# Patient Record
Sex: MALE | Race: WHITE | NOT HISPANIC OR LATINO | Employment: FULL TIME | URBAN - METROPOLITAN AREA
[De-identification: names, ages, dates, MRNs, and addresses within clinical notes are randomized per-mention and may not be internally consistent; named-entity substitution may affect disease eponyms.]

---

## 2017-03-17 ENCOUNTER — TELEPHONE (OUTPATIENT)
Dept: FAMILY MEDICINE | Facility: CLINIC | Age: 37
End: 2017-03-17

## 2017-03-17 RX ORDER — PENICILLIN V POTASSIUM 500 MG/1
500 TABLET, FILM COATED ORAL 3 TIMES DAILY
Qty: 30 TABLET | Refills: 0 | COMMUNITY
Start: 2017-03-17 | End: 2018-05-17

## 2017-03-17 NOTE — TELEPHONE ENCOUNTER
Per RM patient need RX for  mg. See entered historical med order. This was called to [facility] for patient  Gillian Stratton CMA

## 2018-05-15 DIAGNOSIS — Z91.030 BEE STING ALLERGY: ICD-10-CM

## 2018-05-15 DIAGNOSIS — N52.9 ERECTILE DYSFUNCTION, UNSPECIFIED ERECTILE DYSFUNCTION TYPE: ICD-10-CM

## 2018-05-15 NOTE — TELEPHONE ENCOUNTER
"Last Written Prescription Date:  9/19/16  Last Fill Quantity: 10,  # refills: 5   Last office visit: 12/15/2016 with prescribing provider:  12/15/16   Future Office Visit:        Requested Prescriptions   Pending Prescriptions Disp Refills     VIAGRA 100 MG tablet [Pharmacy Med Name: SILDENAFIL 100MG TAB] 6 tablet 9     Sig: TAKE 0.5-1 TABLET BY MOUTH 30 MINUTES BEFORE INTERCOURSE. NO MORE THEN 1 FULL TABLET PER DAY    Erectile Dysfuction Protocol Failed    5/15/2018 11:54 AM       Failed - Recent (12 mo) or future (30 days) visit within the authorizing provider's specialty    Patient had office visit in the last 12 months or has a visit in the next 30 days with authorizing provider or within the authorizing provider's specialty.  See \"Patient Info\" tab in inbasket, or \"Choose Columns\" in Meds & Orders section of the refill encounter.           Passed - Absence of nitrates on medication list       Passed - Absence of Alpha Blockers on Med list       Passed - Patient is age 18 or older          "

## 2018-05-15 NOTE — TELEPHONE ENCOUNTER
"Requested Prescriptions   Pending Prescriptions Disp Refills     EPINEPHrine (EPIPEN/ADRENACLICK/OR ANY BX GENERIC EQUIV) 0.3 MG/0.3ML injection 2-pack [Pharmacy Med Name: EPINEPHRINE 0.3/0.3ML INJ]  0     Sig: INJECT 0.3 MLS INTO THE MUSCLE AS NEEDED FOR ANAPHYLAXIS    Anaphylaxis Kits Protocol Failed    5/15/2018 11:54 AM       Failed - Recent (12 mo) or future (30 days) visit witin the authorizing provider's specialty    Patient had office visit in the last 12 months or has a visit in the next 30 days with authorizing provider or within the authorizing provider's specialty.  See \"Patient Info\" tab in inbasket, or \"Choose Columns\" in Meds & Orders section of the refill encounter.           Passed - Patient is age 5 or older          Last Written Prescription Date:  8/31/16  Last Fill Quantity: 0.6 mL,  # refills: 3   Last Office Visit with [facility] or [facility] prescribing provider:  12/15/16   Future Office Visit:       "

## 2018-05-16 RX ORDER — EPINEPHRINE 0.3 MG/.3ML
INJECTION SUBCUTANEOUS
Qty: 2 ML | Refills: 0 | Status: SHIPPED | OUTPATIENT
Start: 2018-05-16 | End: 2018-05-22

## 2018-05-16 NOTE — TELEPHONE ENCOUNTER
Medication is being filled for 1 time refill only due to:  Patient needs to be seen because it has been more than one year since last visit.  LOV 12/15/16. Notified patient per comment section of RX.   Pt has BEE Allergy..........................SHAGGY Lange

## 2018-05-16 NOTE — TELEPHONE ENCOUNTER
Routing VIAGRA  refill request to provider for review/approval because:  Patient needs to be seen because it has been more than 1 year since last office visit.( LOV: 12/15/16) According to the refill protocol , pt needs to be seen in clinic annually for med use. Notified patient per comment section of RX.  SHAGGY Lange

## 2018-05-17 ENCOUNTER — DOCUMENTATION ONLY (OUTPATIENT)
Dept: SLEEP MEDICINE | Facility: CLINIC | Age: 38
End: 2018-05-17

## 2018-05-17 ENCOUNTER — TELEPHONE (OUTPATIENT)
Dept: FAMILY MEDICINE | Facility: CLINIC | Age: 38
End: 2018-05-17

## 2018-05-17 ENCOUNTER — OFFICE VISIT (OUTPATIENT)
Dept: SLEEP MEDICINE | Facility: CLINIC | Age: 38
End: 2018-05-17
Payer: COMMERCIAL

## 2018-05-17 VITALS
DIASTOLIC BLOOD PRESSURE: 64 MMHG | HEIGHT: 72 IN | RESPIRATION RATE: 20 BRPM | WEIGHT: 229 LBS | HEART RATE: 76 BPM | OXYGEN SATURATION: 95 % | BODY MASS INDEX: 31.02 KG/M2 | SYSTOLIC BLOOD PRESSURE: 127 MMHG

## 2018-05-17 DIAGNOSIS — T63.441A ALLERGIC REACTION TO BEE STING: ICD-10-CM

## 2018-05-17 DIAGNOSIS — R06.81 APNEA: ICD-10-CM

## 2018-05-17 DIAGNOSIS — R06.83 SNORING: Primary | ICD-10-CM

## 2018-05-17 DIAGNOSIS — R53.83 OTHER FATIGUE: Primary | ICD-10-CM

## 2018-05-17 PROCEDURE — 99203 OFFICE O/P NEW LOW 30 MIN: CPT | Performed by: OTOLARYNGOLOGY

## 2018-05-17 RX ORDER — SILDENAFIL CITRATE 100 MG
TABLET ORAL
Qty: 6 TABLET | Refills: 0 | Status: SHIPPED | OUTPATIENT
Start: 2018-05-17 | End: 2020-05-05

## 2018-05-17 NOTE — MR AVS SNAPSHOT
After Visit Summary   5/17/2018    Jon Gilmore    MRN: [id number]           Patient Information     Date Of Birth          1980        Visit Information        Provider Department      5/17/2018 4:45 PM Chas Aldridge MD [facility]        Today's Diagnoses     Snoring    -  1    Apnea           Follow-ups after your visit        Your next 10 appointments already scheduled     May 22, 2018  1:00 PM CDT   New Visit with Kamaljit Vazquez DO   [facility]    [address]  [place] [address]   [phone]              Future tests that were ordered for you today     Open Future Orders        Priority Expected Expires Ordered    Comprehensive Sleep Study Routine  11/13/2018 5/17/2018    SLEEP EVALUATION & MANAGEMENT REFERRAL - ADULT -[facility] [phone] (Age 13 if over 100 lbs) Routine  5/17/2019 5/17/2018            Who to contact     If you have questions or need follow up information about today's clinic visit or your schedule please contact [facility] directly at [phone].  Normal or non-critical lab and imaging results will be communicated to you by kissnofroghart, letter or phone within 4 business days after the clinic has received the results. If you do not hear from us within 7 days, please contact the clinic through kissnofroghart or phone. If you have a critical or abnormal lab result, we will notify you by phone as soon as possible.  Submit refill requests through Rekoo or call your pharmacy and they will forward the refill request to us. Please allow 3 business days for your refill to be completed.          Additional Information About Your Visit        MyChart Information     Rekoo gives you secure access to your electronic health record. If you see a primary care provider, you can also send messages to your care team and make appointments. If you  "have questions, please call your primary care clinic.  If you do not have a primary care provider, please call [phone] and they will assist you.        Care EveryWhere ID     This is your Care EveryWhere ID. This could be used by other organizations to access your [place] medical records  [id number]        Your Vitals Were     Pulse Respirations Height Pulse Oximetry BMI (Body Mass Index)       76 20 1.816 m (5' 11.5\") 95% 31.49 kg/m2        Blood Pressure from Last 3 Encounters:   05/17/18 127/64   12/15/16 114/78   11/21/16 108/78    Weight from Last 3 Encounters:   05/17/18 103.9 kg (229 lb)   12/15/16 103.4 kg (228 lb)   11/21/16 104.3 kg (230 lb)               Primary Care Provider Office Phone # Fax #    Cedalexey Herrera [phone] [address] [place] [address]        Equal Access to Services     BEN HANNA : Hadii aad ku hadasho Soomaali, waaxda luqadaha, qaybta kaalmada adeegyada, waxay idiin hayaan little ang . So [facility] [phone].    ATENCIÓN: Si habla español, tiene a candelaria disposición servicios gratuitos de asistencia lingüística. [facility] [phone].    We comply with applicable federal civil rights laws and [place] laws. We do not discriminate on the basis of race, color, national origin, age, disability, sex, sexual orientation, or gender identity.            Thank you!     Thank you for choosing [facility]  for your care. Our goal is always to provide you with excellent care. Hearing back from our patients is one way we can continue to improve our services. Please take a few minutes to complete the written survey that you may receive in the mail after your visit with us. Thank you!             Your Updated Medication List - Protect others around you: Learn how to safely use, store and throw away your medicines at www.disposemymeds.org.          This list is accurate as of 5/17/18  5:57 PM.  Always use your most recent " med list.                   Brand Name Dispense Instructions for use Diagnosis    EPINEPHrine 0.3 MG/0.3ML injection 2-pack    EPIPEN/ADRENACLICK/or ANY BX GENERIC EQUIV    2 mL    INJECT 0.3 MLS INTO THE MUSCLE AS NEEDED FOR ANAPHYLAXIS    Bee sting allergy       VIAGRA 100 MG tablet   Generic drug:  sildenafil     6 tablet    TAKE 0.5-1 TABLET BY MOUTH 30 MINUTES BEFORE INTERCOURSE. NO MORE THEN 1 FULL TABLET PER DAY    Erectile dysfunction, unspecified erectile dysfunction type

## 2018-05-17 NOTE — TELEPHONE ENCOUNTER
Per provider referral was placed.  The sleep center will call patient. And patient will call to schedule with Allergy. Patient informed.  Vito Hansen MA

## 2018-05-17 NOTE — NURSING NOTE
Patient called wanting to set up a consult with a sleep provider. Appointment is scheduled.  Patient also asked what his [employer] would cover. Called [employer] and customer care service told me that the office visit is covered and no prior authorizations needed for HST or in lab test to be completed. If CPAP treatment is needed it is also covered and no prior authorizations needed.     Pt's Group number on card is scratched off by being in wallet. His member ID is [id number].    Called and let pt know.    Jessi Yeboah, CMA

## 2018-05-17 NOTE — PROGRESS NOTES
Sleep Consultation:    Date on this visit: 5/17/2018    Jon Gilmore is sent by No ref. provider found for a sleep consultation regarding snoring.    Primary Physician: Ced Herrera     Jon Gilmore reports nightly snoring and gasping for few years.     Jon goes to sleep at 11:00 PM during the week but varies and inconsistent. He wakes up at 6:00 AM with an alarm. He falls asleep in 5 minutes.  Jon denies difficulty falling asleep.  He wakes up 1-2 times a night for 20 minutes before falling back to sleep.  Jon wakes up to go to the bathroom.  On weekends, Jon goes to sleep at 11:00 PM.  He wakes up at 8:00 AM without an alarm. He falls asleep in 5 minutes.  Patient gets an average of 6 to 7 hours of sleep per night.     Patient does NOT use electronics in bed, watch TV in bed and read in bed.     Jon does not do shift work.  He works day shifts.      Jon does snore every night. Patient does have a regular bed partner. There is report of snoring and gasping.  He does have witnessed apneas. They occasionally sleep separately.  Patient sleeps on his back, side and  Occasionally stomach. He denies occasional sleep disturbance, snort arousals, morning dry mouth, morning headaches and morning confusion. Jon denies any sleep walking, sleep talking, dream enactment, sleep paralysis, cataplexy and hypnogogic/hypnopompic hallucinations. Occasionally has bruxism at night. Even though no RLS but wears sheets at night with constant leg movement and tossing and turning.    He denies sleep walking, sleep talking, enuresis and sleep terrors as a child.  Jon has some difficulty breathing through his nose.      Jon has gained 0-5 pounds in the last year.  Patient describes themself as a night person. Patient's Dexter Sleepiness score 8/24 inconsistent with excessive  daytime sleepiness.      Jon naps 1-2 times per week for 10-20 minutes, feels refreshed after naps. He takes  no inadvertant naps.  He denies closing eyes, dozing and falling asleep while driving.   Patient was counseled on the importance of driving while alert, to pull over if drowsy, or nap before getting into the vehicle if sleepy.  He uses 1 to 2 sodas/day. Last caffeine intake is usually before noon.    Allergies:    Allergies   Allergen Reactions     Bee Anaphylaxis     Cats Unknown     Itchey eyes, an runny nose       Medications:    Current Outpatient Prescriptions   Medication Sig Dispense Refill     VIAGRA 100 MG tablet TAKE 0.5-1 TABLET BY MOUTH 30 MINUTES BEFORE INTERCOURSE. NO MORE THEN 1 FULL TABLET PER DAY 6 tablet 0     EPINEPHrine (EPIPEN/ADRENACLICK/OR ANY BX GENERIC EQUIV) 0.3 MG/0.3ML injection 2-pack INJECT 0.3 MLS INTO THE MUSCLE AS NEEDED FOR ANAPHYLAXIS (Patient not taking: Reported on 5/17/2018) 2 mL 0       Problem List:  Patient Active Problem List    Diagnosis Date Noted     Old complete tear of anterior cruciate ligament of right knee 08/11/2016     Priority: Medium     Chondromalacia of left knee 08/11/2016     Priority: Medium     CARDIOVASCULAR SCREENING; LDL GOAL LESS THAN 160 10/31/2010     Priority: Medium        Past Medical/Surgical History:  History reviewed. No pertinent past medical history.  Past Surgical History:   Procedure Laterality Date     C INCISION OF PYLORIC MUSCLE      infant:  pyloric stenosis       Social History:  Social History     Social History     Marital status:      Spouse name: Steven     Number of children: 1     Years of education: N/A     Occupational History      [employer]     Social History Main Topics     Smoking status: Never Smoker     Smokeless tobacco: Never Used     Alcohol use 0.0 oz/week     0 Standard drinks or equivalent per week      Comment: occ     Drug use: No     Sexual activity: Yes     Partners: Female     Other Topics Concern     Parent/Sibling W/ Cabg, Mi Or Angioplasty Before 65f 55m? No     Social History Narrative  "      Family History:  Family History   Problem Relation Age of Onset     Depression Father        Review of Systems:  A complete review of systems reviewed by me is negative with the exeption of what has been mentioned in the history of present illness.  CONSTITUTIONAL: NEGATIVE for weight gain/loss, fever, chills, sweats or night sweats, drug allergies.  EYES: NEGATIVE for changes in vision, blind spots, double vision.  ENT: NEGATIVE for ear pain, sore throat, sinus pain, post-nasal drip, runny nose, bloody nose  CARDIAC: NEGATIVE for fast heartbeats or fluttering in chest, chest pain or pressure, breathlessness when lying flat, swollen legs or swollen feet.  NEUROLOGIC: NEGATIVE headaches, weakness or numbness in the arms or legs.  DERMATOLOGIC: NEGATIVE for rashes, new moles or change in mole(s)  PULMONARY: NEGATIVE SOB at rest, SOB with activity, dry cough, productive cough, coughing up blood, wheezing or whistling when breathing.    GASTROINTESTINAL: NEGATIVE for nausea or vomitting, loose or watery stools, fat or grease in stools, constipation, abdominal pain, bowel movements black in color or blood noted.  GENITOURINARY: NEGATIVE for pain during urination, blood in urine, urinating more frequently than usual, irregular menstrual periods.  MUSCULOSKELETAL: NEGATIVE for muscle pain, bone or joint pain, swollen joints.  ENDOCRINE: NEGATIVE for increased thirst or urination, diabetes.  LYMPHATIC: NEGATIVE for swollen lymph nodes, lumps or bumps in the breasts or nipple discharge.    Physical Examination:  Vitals: /64  Pulse 76  Resp 20  Ht 1.816 m (5' 11.5\")  Wt 103.9 kg (229 lb)  SpO2 95%  BMI 31.49 kg/m2  BMI= Body mass index is 31.49 kg/(m^2).         [place] Total Score 5/17/2018   Total score - [place] 3       GENERAL APPEARANCE: healthy, alert, no distress and over weight  EYES: Eyes grossly normal to inspection, PERRL and conjunctivae and sclerae normal  HENT: ear canals and TM's normal, nose " and mouth without ulcers or lesions, oropharynx crowded and uvula elongated  NECK: no adenopathy, no asymmetry, masses, or scars and thyroid normal to palpation  NEURO: Normal strength and tone, mentation intact and speech normal  PSYCH: mentation appears normal and affect normal/bright  Mallampati Class: III.  Tonsillar Stage: 1  hidden by pillars.  Nose - deviated septum to the left  Impression/Plan:    The patient with symptoms of snoring, and witnessed apneas.  Literature provided regarding sleep apnea and restless leg syndrome.      He will follow up with me in approximately two weeks after his sleep study has been competed to review the results and discuss plan of care.       Polysomnography reviewed.  Obstructive sleep apnea reviewed.  Complications of untreated sleep apnea were reviewed.    Chas Aldridge MD    CC: No ref. provider found

## 2018-05-21 ENCOUNTER — TELEPHONE (OUTPATIENT)
Dept: FAMILY MEDICINE | Facility: OTHER | Age: 38
End: 2018-05-21

## 2018-05-21 NOTE — TELEPHONE ENCOUNTER
Returned the patient's call, no answer, left a message to call back to discuss. Left the patient my direct extension for the day [phone]. Informed the patient that I will be going to lunch, but I can be reached at my extension any time after 2:00pm today. Will wait for a call back.    Dana Phillips CMA on 5/21/2018 at 1:14 PM

## 2018-05-21 NOTE — TELEPHONE ENCOUNTER
Reason for Call:  Other call back    Detailed comments: Pt is seeing Dr. Vazquez tomorrow and is wondering if he is going to get the shot this day as well, or what is all going on for this appt, as he needs to call insurance. Please advise.     Phone Number Patient can be reached at: Home number on file [phone] (home)    Best Time: any     Can we leave a detailed message on this number? YES    Call taken on 5/21/2018 at 12:54 PM by Gillian Barroso

## 2018-05-22 ENCOUNTER — OFFICE VISIT (OUTPATIENT)
Dept: ALLERGY | Facility: OTHER | Age: 38
End: 2018-05-22
Payer: COMMERCIAL

## 2018-05-22 VITALS
TEMPERATURE: 97.8 F | HEART RATE: 72 BPM | DIASTOLIC BLOOD PRESSURE: 70 MMHG | BODY MASS INDEX: 31.29 KG/M2 | WEIGHT: 227.5 LBS | SYSTOLIC BLOOD PRESSURE: 106 MMHG | OXYGEN SATURATION: 96 %

## 2018-05-22 DIAGNOSIS — J30.81 ALLERGIC RHINITIS DUE TO ANIMAL DANDER: ICD-10-CM

## 2018-05-22 DIAGNOSIS — J30.89 ALLERGIC RHINITIS DUE TO MOLD: ICD-10-CM

## 2018-05-22 DIAGNOSIS — T78.2XXD ANAPHYLAXIS DUE TO HYMENOPTERA VENOM, ACCIDENTAL OR UNINTENTIONAL, SUBSEQUENT ENCOUNTER: ICD-10-CM

## 2018-05-22 DIAGNOSIS — T63.481D ANAPHYLAXIS DUE TO HYMENOPTERA VENOM, ACCIDENTAL OR UNINTENTIONAL, SUBSEQUENT ENCOUNTER: ICD-10-CM

## 2018-05-22 DIAGNOSIS — J30.1 CHRONIC SEASONAL ALLERGIC RHINITIS DUE TO POLLEN: Primary | ICD-10-CM

## 2018-05-22 DIAGNOSIS — J30.89 ALLERGIC RHINITIS DUE TO DUST MITE: ICD-10-CM

## 2018-05-22 PROBLEM — H10.9 RHINOCONJUNCTIVITIS: Status: ACTIVE | Noted: 2018-05-22

## 2018-05-22 PROBLEM — J31.0 RHINOCONJUNCTIVITIS: Status: ACTIVE | Noted: 2018-05-22

## 2018-05-22 PROCEDURE — 86003 ALLG SPEC IGE CRUDE XTRC EA: CPT | Mod: 90 | Performed by: ALLERGY & IMMUNOLOGY

## 2018-05-22 PROCEDURE — 99204 OFFICE O/P NEW MOD 45 MIN: CPT | Mod: 25 | Performed by: ALLERGY & IMMUNOLOGY

## 2018-05-22 PROCEDURE — 86003 ALLG SPEC IGE CRUDE XTRC EA: CPT | Performed by: ALLERGY & IMMUNOLOGY

## 2018-05-22 PROCEDURE — 36415 COLL VENOUS BLD VENIPUNCTURE: CPT | Performed by: ALLERGY & IMMUNOLOGY

## 2018-05-22 PROCEDURE — 99000 SPECIMEN HANDLING OFFICE-LAB: CPT | Performed by: ALLERGY & IMMUNOLOGY

## 2018-05-22 PROCEDURE — 95004 PERQ TESTS W/ALRGNC XTRCS: CPT | Performed by: ALLERGY & IMMUNOLOGY

## 2018-05-22 RX ORDER — EPINEPHRINE 0.3 MG/.3ML
0.3 INJECTION SUBCUTANEOUS PRN
Qty: 2 ML | Refills: 1 | Status: SHIPPED | OUTPATIENT
Start: 2018-05-22 | End: 2018-06-01

## 2018-05-22 NOTE — NURSING NOTE
Per provider verbal order, placed Adult Environmental Panel scratch test.  Consent was obtained prior to procedure.  Once panels were placed, patient was monitored for 15 minutes in clinic.  RN read test after 15 minutes and provider was notified of results.  Pt tolerated procedure well.  All questions and concerns were addressed at office visit.     Thelma Cruz RN

## 2018-05-22 NOTE — TELEPHONE ENCOUNTER
Patient returned call, he wanted to know more about allergy testing. Went over the type of skin prick testing that we do and the process. Patient will keep his appointment and discuss further with doctor.    Dana Phillips CMA on 5/22/2018 at 10:03 AM

## 2018-05-22 NOTE — PROGRESS NOTES
Jon Gilmore is a 37 year old White male with previous medical history significant for venom allergy. Jon Gilmore is being seen today for evaluation of insect bite sensitivity and seasonal allergies.  The patient has been seen in consultation at the request of Dr. Frances WILLAMS.     The patient reports that for the majority of his life in the spring he has had sneezing, congestion, ocular itching and rhinorrhea.  Allegra has been used and unclear if beneficial.  He is using over-the-counter eyedrop and this has been helpful.  No symptoms around dogs, dust, mowing grass, raking leaves, smoke or perfumes.  Increased symptoms around cats.  No history of allergy testing.  No use of nasal corticosteroid.    Patient was stung by a flying stinging insect approximately 12 years ago.  He was stung on his great toe.  He immediately developed shortness of breath, diffuse pruritus, erythema of his skin, and a sensation that he was going to pass out.  His father has venom allergy and treated him with his EpiPen and this was significantly beneficial.  No subsequent stings.  He does not carry injectable epinephrine.  No history of venom skin testing.    The patient has no history of asthma, eczema, food allergies, medications allergies or hives.     ENVIRONMENTAL HISTORY: The family lives in a [facility] home [place] The home is heated with a forced air. They does have central air conditioning. The patient's bedroom is furnished with feather/wool bedding or pillows, carpeting in bedroom, allergen mattress cover and fabric window coverings.  Pets inside the house include 0 pets. There is not history of cockroach or mice infestation. There is/are 0 smokers in the house.  The house does not have a damp basement.     History reviewed. No pertinent past medical history.  Family History   Problem Relation Age of Onset     Depression Father      Past Surgical History:   Procedure Laterality Date     C INCISION OF  PYLORIC MUSCLE      infant:  pyloric stenosis       REVIEW OF SYSTEMS:  General: negative for weight gain. negative for weight loss. negative for changes in sleep.   Ears: negative for fullness. negative for hearing loss. negative for dizziness.   Nose: positive  for snoring.negative for changes in smell. negative for drainage.   Eyes: negative for eye watering. positive  for eye itching. negative for vision changes. negative for eye redness.  Throat: negative for hoarseness. negative for sore throat. negative for trouble swallowing.   Lungs: negative for shortness of breath.negative for wheezing. negative for sputum production.   Cardiovascular: negative for chest pain. negative for swelling of ankles. negative for fast or irregular heartbeat.   Gastrointestinal: negative for nausea. negative for heartburn. negative for acid reflux.   Musculoskeletal: negative for joint pain. negative for joint stiffness. negative for joint swelling.   Neurologic: negative for seizures. negative for fainting. negative for weakness.   Psychiatric: negative for changes in mood. positive  for anxiety.   Endocrine: negative for cold intolerance. negative for heat intolerance. negative for tremors.   Lymphatic: negative for lower extremity swelling. negative for lymph node swelling.   Hematologic: negative for easy bruising. negative for easy bleeding.  Integumentary: negative for rash. negative for scaling. negative for nail changes.       Current Outpatient Prescriptions:      EPINEPHrine (AUVI-Q) 0.3 MG/0.3ML injection 2-pack, Inject 0.3 mLs (0.3 mg) into the muscle as needed for anaphylaxis, Disp: 2 mL, Rfl: 1     ketotifen (ZADITOR/REFRESH ANTI-ITCH) 0.025 % SOLN ophthalmic solution, Place 1 drop into both eyes 2 times daily, Disp: 1 Bottle, Rfl: 3     VIAGRA 100 MG tablet, TAKE 0.5-1 TABLET BY MOUTH 30 MINUTES BEFORE INTERCOURSE. NO MORE THEN 1 FULL TABLET PER DAY, Disp: 6 tablet, Rfl: 0  Immunization History   Administered  Date(s) Administered     HEPA 02/11/2016     HepA-Adult 11/21/2016     HepB 10/07/2010, 11/04/2010, 02/03/2011     Influenza (IIV3) PF 11/05/2012     Influenza Vaccine IM 3yrs+ 4 Valent IIV4 12/18/2013, 02/11/2016, 11/21/2016     Influenza Vaccine, 3 YRS +, IM (QUADRIVALENT W/PRESERVATIVES) 11/06/2014     TD (ADULT, 7+) 01/01/2009     TDAP Vaccine (Adacel) 11/05/2012     Allergies   Allergen Reactions     Bee Anaphylaxis     Cats Unknown     Itchey eyes, an runny nose         EXAM:   Constitutional:  Appears well-developed and well-nourished. No distress.   HEENT:   Head: Normocephalic.   Right Ear: External ear normal. TM normal  Left Ear: External ear normal. TM normal  Mouth/Throat: No oropharyngeal exudate present.   No cobblestoning of posterior oropharynx.   Nasal tissue pale.    Eyes: Conjunctivae are erythematous   Ocular watering noted   No maxillary or frontal sinus tenderness to palpation.   Cardiovascular: Normal rate, regular rhythm and normal heart sounds. Exam reveals no gallop and no friction rub.   No murmur heard.  Respiratory: Effort normal and breath sounds normal. No respiratory distress. No wheezes. No rales.   Musculoskeletal: Normal range of motion.   Neuro: Oriented to person, place, and time.  Skin: Skin is warm and dry. No rash noted.   Psychiatric: Normal mood and affect.     Nursing note and vitals reviewed.      WORKUP:   Skin testing:  Positive for cat, dog, dust mite, trees, molds.    ASSESSMENT/PLAN:  Problem List Items Addressed This Visit        Respiratory    Chronic seasonal allergic rhinitis due to pollen - Primary     Spring nasal and ocular symptoms.     Skin testing:  Positive for cat, dog, dust mite, trees, molds.    - Ketotifen (Zaditor, Alaway) 1 drop/eye twice daily as needed.   - Allegra or Zyrtec daily as needed.   - Allergen avoidance measures discussed and literature provided.          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  No subsequent stings.  No history of venom testing.  No history of venom immunotherapy.  Next    -Serum IgE for yellow jacket, white faced hornet, yellow faced hornet, honeybee and wasp.  -If positive testing would recommend venom immunotherapy.  If negative testing he needs follow-up venom skin testing when this comes off back order.  -Continue to avoid insect stings.  -An anaphylaxis action plan was given and reviewed with patient and family.   -Injectable epinephrine use was reviewed and demonstrated. The patient will need to carry injectable epinephrine.   -Injectable epinephrine script provided.            Relevant Medications    EPINEPHrine (AUVI-Q) 0.3 MG/0.3ML injection 2-pack    Other Relevant Orders    Allergen honeybee IgE (Completed)    Allergen paper wasp IgE (Completed)    Allergen whiteface hornet IgE (Completed)    Allergen yellow hornet IgE (Completed)    IgE for yellow jacket: Laboratory Miscellaneous Order (Completed)        Return yearly.    Chart documentation with Dragon Voice recognition Software. Although reviewed after completion, some words and grammatical errors may remain.    Kamaljit Vazquez DO   Allergy/Immunology  [facility], [place] and [place]

## 2018-05-22 NOTE — LETTER
5/22/2018         RE: Jon Gilmore  [id number] [facility] [id number]        Dear Colleague,    Thank you for referring your patient, Jon Gilmore, to the [facility]. Please see a copy of my visit note below.    Jon Gilmore is a 37 year old White male with previous medical history significant for venom allergy. Jon Gilmore is being seen today for evaluation of insect bite sensitivity and seasonal allergies.  The patient has been seen in consultation at the request of Dr. Frances WILLAMS.     The patient reports that for the majority of his life in the spring he has had sneezing, congestion, ocular itching and rhinorrhea.  Allegra has been used and unclear if beneficial.  He is using over-the-counter eyedrop and this has been helpful.  No symptoms around dogs, dust, mowing grass, raking leaves, smoke or perfumes.  Increased symptoms around cats.  No history of allergy testing.  No use of nasal corticosteroid.    Patient was stung by a flying stinging insect approximately 12 years ago.  He was stung on his great toe.  He immediately developed shortness of breath, diffuse pruritus, erythema of his skin, and a sensation that he was going to pass out.  His father has venom allergy and treated him with his EpiPen and this was significantly beneficial.  No subsequent stings.  He does not carry injectable epinephrine.  No history of venom skin testing.    The patient has no history of asthma, eczema, food allergies, medications allergies or hives.     ENVIRONMENTAL HISTORY: The family lives in a [facility] home [place]. The home is heated with a forced air. They does have central air conditioning. The patient's bedroom is furnished with feather/wool bedding or pillows, carpeting in bedroom, allergen mattress cover and fabric window coverings.  Pets inside the house include 0 pets. There is not history of cockroach or mice infestation. There is/are 0 smokers in the  house.  The house does not have a damp basement.     History reviewed. No pertinent past medical history.  Family History   Problem Relation Age of Onset     Depression Father      Past Surgical History:   Procedure Laterality Date     C INCISION OF PYLORIC MUSCLE      infant:  pyloric stenosis       REVIEW OF SYSTEMS:  General: negative for weight gain. negative for weight loss. negative for changes in sleep.   Ears: negative for fullness. negative for hearing loss. negative for dizziness.   Nose: positive  for snoring.negative for changes in smell. negative for drainage.   Eyes: negative for eye watering. positive  for eye itching. negative for vision changes. negative for eye redness.  Throat: negative for hoarseness. negative for sore throat. negative for trouble swallowing.   Lungs: negative for shortness of breath.negative for wheezing. negative for sputum production.   Cardiovascular: negative for chest pain. negative for swelling of ankles. negative for fast or irregular heartbeat.   Gastrointestinal: negative for nausea. negative for heartburn. negative for acid reflux.   Musculoskeletal: negative for joint pain. negative for joint stiffness. negative for joint swelling.   Neurologic: negative for seizures. negative for fainting. negative for weakness.   Psychiatric: negative for changes in mood. positive  for anxiety.   Endocrine: negative for cold intolerance. negative for heat intolerance. negative for tremors.   Lymphatic: negative for lower extremity swelling. negative for lymph node swelling.   Hematologic: negative for easy bruising. negative for easy bleeding.  Integumentary: negative for rash. negative for scaling. negative for nail changes.       Current Outpatient Prescriptions:      EPINEPHrine (AUVI-Q) 0.3 MG/0.3ML injection 2-pack, Inject 0.3 mLs (0.3 mg) into the muscle as needed for anaphylaxis, Disp: 2 mL, Rfl: 1     ketotifen (ZADITOR/REFRESH ANTI-ITCH) 0.025 % SOLN ophthalmic solution,  Place 1 drop into both eyes 2 times daily, Disp: 1 Bottle, Rfl: 3     VIAGRA 100 MG tablet, TAKE 0.5-1 TABLET BY MOUTH 30 MINUTES BEFORE INTERCOURSE. NO MORE THEN 1 FULL TABLET PER DAY, Disp: 6 tablet, Rfl: 0  Immunization History   Administered Date(s) Administered     HEPA 02/11/2016     HepA-Adult 11/21/2016     HepB 10/07/2010, 11/04/2010, 02/03/2011     Influenza (IIV3) PF 11/05/2012     Influenza Vaccine IM 3yrs+ 4 Valent IIV4 12/18/2013, 02/11/2016, 11/21/2016     Influenza Vaccine, 3 YRS +, IM (QUADRIVALENT W/PRESERVATIVES) 11/06/2014     TD (ADULT, 7+) 01/01/2009     TDAP Vaccine (Adacel) 11/05/2012     Allergies   Allergen Reactions     Bee Anaphylaxis     Cats Unknown     Itchey eyes, an runny nose         EXAM:   Constitutional:  Appears well-developed and well-nourished. No distress.   HEENT:   Head: Normocephalic.   Right Ear: External ear normal. TM normal  Left Ear: External ear normal. TM normal  Mouth/Throat: No oropharyngeal exudate present.   No cobblestoning of posterior oropharynx.   Nasal tissue pale.    Eyes: Conjunctivae are erythematous   Ocular watering noted   No maxillary or frontal sinus tenderness to palpation.   Cardiovascular: Normal rate, regular rhythm and normal heart sounds. Exam reveals no gallop and no friction rub.   No murmur heard.  Respiratory: Effort normal and breath sounds normal. No respiratory distress. No wheezes. No rales.   Musculoskeletal: Normal range of motion.   Neuro: Oriented to person, place, and time.  Skin: Skin is warm and dry. No rash noted.   Psychiatric: Normal mood and affect.     Nursing note and vitals reviewed.      WORKUP:   Skin testing:  Positive for cat, dog, dust mite, trees, molds.    ASSESSMENT/PLAN:  Problem List Items Addressed This Visit        Respiratory    Chronic seasonal allergic rhinitis due to pollen - Primary     Spring nasal and ocular symptoms.     Skin testing:  Positive for cat, dog, dust mite, trees, molds.    - Ketotifen  (Zaditor, Alaway) 1 drop/eye twice daily as needed.   - Allegra or Zyrtec daily as needed.   - Allergen avoidance measures discussed and literature provided.          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  No subsequent stings.  No history of venom testing.  No history of venom immunotherapy.  Next    -Serum IgE for yellow jacket, white faced hornet, yellow faced hornet, honeybee and wasp.  -If positive testing would recommend venom immunotherapy.  If negative testing he needs follow-up venom skin testing when this comes off back order.  -Continue to avoid insect stings.  -An anaphylaxis action plan was given and reviewed with patient and family.   -Injectable epinephrine use was reviewed and demonstrated. The patient will need to carry injectable epinephrine.   -Injectable epinephrine script provided.            Relevant Medications    EPINEPHrine (AUVI-Q) 0.3 MG/0.3ML injection 2-pack    Other Relevant Orders    Allergen honeybee IgE (Completed)    Allergen paper wasp IgE (Completed)    Allergen whiteface hornet IgE (Completed)    Allergen yellow hornet IgE (Completed)    IgE for yellow jacket: Laboratory Miscellaneous Order (Completed)        Return yearly.    Chart documentation with Dragon Voice recognition Software. Although reviewed after completion, some words and grammatical errors may remain.    Kamaljit Vazquez,    Allergy/Immunology  [facility], [place] and [place], [place]      Again, thank you for allowing me to participate in the care  of your patient.        Sincerely,        Kamaljit Vazquez, DO

## 2018-05-22 NOTE — MR AVS SNAPSHOT
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, accidental or unintentional, subsequent encounter          Care Instructions    Allergy Staff Appt Hours Shot Hours Locations    Physician     Kamaljit Vazquez DO       Support Staff     Thelma ESTRADA RN      Dana ESTRADA, [facility]  Monday:                      [place] 8-7     Tuesday:         [place] 8-5     Wednesday:        [place]: 7-5     Friday:        [place] 7-5   [place]        Monday: 9-5:50        Wednesday: 2-5:50        Friday: 7-12:50     [place]        Tuesday: 7-10:50        Thursday: 1:30-6:30        Friday: 8:00-3:50     [place]        Monday: 7:10-4:50        Tuesday: 12:30-6:30        Thursday: 7-11:50 [facility]  [address] [place] [address]  Appt Line: [phone]  Allergy RN (Monday):  [phone]    [facility]  [address] [place] [address]  Appt Line: [phone]  Allergy RN (Tues & Wed):  [phone]    [facility]  [address] [place] [address]  Appt Line: [phone]  Allergy RN (Friday):  [phone]       Important Scheduling Information  Aspirin Desensitization: Appt will last 2 clinic days. Please call the Allergy RN line for your clinic to schedule. Discontinue antihistamines 7 days prior to the appointment.     Food Challenges: Appt will last 3-4 hours. Please call the Allergy RN line for your clinic to schedule. Discontinue antihistamines 7 days prior to the appointment.     Penicillin Testing: Appt will last 2-3 hours. Please call the Allergy RN line for your clinic to schedule. Discontinue antihistamines 7 days prior to the  appointment.     Skin Testing: Appt will about 40 minutes. Call the appointment line for your clinic to schedule. Discontinue antihistamines 7 days prior to the appointment.     Venom Testing: Appt will last 2-3 hours. Please call the Allergy RN line for your clinic to schedule. Discontinue antihistamines 7 days prior to the appointment.     Thank you for trusting us with your Allergy, Asthma, and Immunology care. Please feel free to contact us with any questions or concerns you may have.      - Ketotifen (Zaditor, Alaway) 1 drop/eye twice daily as needed.   - Zyrtec or Allegra daily as needed.   - Blood testing today.   - Consider venom allergy shots.   - Avoid insect stings.     AEROALLERGEN AVOIDANCE INSTRUCTIONS  MOLD  Indoors, mold season is year round. Outdoors, most mold prefer seasons with high humidity. Mold prefers damp, dark, warm places. Here are some tips on how to avoid mold exposure.  1.  Keep humidity inside between 35-50% with air conditioning or dehumidifier. The humidity level can be checked with a meter from a hardware store.   2.  Clean surfaces where mold grows and dry wet areas.  3.  Avoid steam cleaning carpets and discard moldy belongings.  4.  Wear a mask when doing yard work and refrain from walking through uncut fields or playing in leaves.  5.  Minimize use of potted plants and do not keep them indoors.  6.  Consider an allergy cover for the pillow and mattress.  POLLEN  Pollens are the tiny airborne particles given off by trees, weeds, and grasses. They can be the cause of seasonal allergic rhinitis or hay fever symptoms, which include stuffy, itchy, runny nose, redness, swelling and itching of the eyes, and itching of the ears and throat. Here are some tips on how to avoid pollen exposure.  1. .Keep windows closed and use the air conditioner when possible.  2.  Avoid outside exposure in the early morning as pollen counts are highest at that time.  3.  Take a shower and wash hair each  night.  4.  Consider wearing a mask when working in the yard and/or garden.  5.  Clean furnace filter monthly with HEPA filters. Consider a HEPA filter vacuum  which will prevent pollen from being reintroduced into the air.   DUST MITES  Dust mites can never be entirely eliminated in the house no matter how clean your house is. Dust mites are attracted to warm, moist areas and feed on dead skin flakes. Here are tips to minimize dust mites in your home.  1.  Encase pillows and mattress/box springs in zippered allergy covers.  2.  Wash bedding in hot water (at least 130 F) every 7-14 days.  3.  Avoid curtains, carpet, and upholstered furniture if possible.  4.  Use HEPA air filters and a HEPA filter vacuum . Change filters monthly. Vacuum weekly.  5.  Keep bedroom simple, avoiding clutter, so it can quickly be dusted.  6.  Cover heating vents with vent filters.  7.  Keep stuffed toys in a closed container and wash or freeze regularly.  8.  Keep clothing in the closet with the door closed.  PETS  Pets present many problems for people with allergies. Dander from pets is very difficult to remove and also is a food source for dust mites.  1.  If possible, find the pet a new home.  2.  If not possible, keep the pet outdoors. Never allow the pet into the bedroom.  3.  Wash pet weekly in warm water.  4.  Encase mattresses, pillows, and box springs in allergen-proof covers.  5.  Use HEPA air filters and a HEPA filter vacuum . Change filters monthly.              Follow-ups after your visit        Your next 10 appointments already scheduled     Jun 13, 2018  8:00 PM CDT   PSG Split with PH BED 2   [facility] ([facility])    [address] [place] [id number] [phone]              Who to contact     If you have questions or need follow up information about today's clinic visit or your schedule please contact [facility] ALEXIS VALENTINE directly at  [phone].  Normal or non-critical lab and imaging results will be communicated to you by [employer], letter or phone within 4 business days after the clinic has received the results. If you do not hear from us within 7 days, please contact the clinic through [employer] or phone. If you have a critical or abnormal lab result, we will notify you by phone as soon as possible.  Submit refill requests through [employer] or call your pharmacy and they will forward the refill request to us. Please allow 3 business days for your refill to be completed.          Additional Information About Your Visit        [employer] Information     [employer] gives you secure access to your electronic health record. If you see a primary care provider, you can also send messages to your care team and make appointments. If you have questions, please call your primary care clinic.  If you do not have a primary care provider, please call [phone] and they will assist you.        Care EveryWhere ID     This is your Care EveryWhere ID. This could be used by other organizations to access your [facility] medical records  [id number]        Your Vitals Were     Pulse Temperature Pulse Oximetry BMI (Body Mass Index)          72 97.8  F (36.6  C) (Oral) 96% 31.29 kg/m2         Blood Pressure from Last 3 Encounters:   05/22/18 106/70   05/17/18 127/64   12/15/16 114/78    Weight from Last 3 Encounters:   05/22/18 103.2 kg (227 lb 8 oz)   05/17/18 103.9 kg (229 lb)   12/15/16 103.4 kg (228 lb)              We Performed the Following     Allergen honeybee IgE     Allergen paper wasp IgE     Allergen whiteface hornet IgE     Allergen yellow hornet IgE     ALLERGY SKIN TESTS,ALLERGENS     IgE for yellow jacket: Laboratory Miscellaneous Order          Today's Medication Changes          These changes are accurate as of 5/22/18  2:02 PM.  If you have any questions, ask your nurse or doctor.               Start taking these medicines.        Dose/Directions     ketotifen 0.025 % Soln ophthalmic solution   Commonly known as:  ZADITOR/REFRESH ANTI-ITCH   Used for:  Chronic seasonal allergic rhinitis due to pollen   Started by:  Kamaljit Vazquez DO        Dose:  1 drop   Place 1 drop into both eyes 2 times daily   Quantity:  1 Bottle   Refills:  3         These medicines have changed or have updated prescriptions.        Dose/Directions    EPINEPHrine 0.3 MG/0.3ML injection 2-pack   Commonly known as:  AUVI-Q   This may have changed:  See the new instructions.   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, accidental or unintentional, subsequent encounter   Changed by:  Kamaljit Vazquez DO        Dose:  0.3 mg   Inject 0.3 mLs (0.3 mg) into the muscle as needed for anaphylaxis   Quantity:  2 mL   Refills:  1            Where to get your medicines      These medications were sent to [facility], [address] [address]     Phone:  [phone]     EPINEPHrine 0.3 MG/0.3ML injection 2-pack         These medications were sent to [place] [address]  [address] [place] [address]     Phone:  [phone]     ketotifen 0.025 % Soln ophthalmic solution                Primary Care Provider Office Phone # Fax #    Ced Rasta Herrera [phone]       [address] [facility]   [facility] [address]        Equal Access to Services     [facility] Hospital[facility]CI AH: Hadii aad ku hadasho Soomaali, waaxda luqadaha, qaybta kaalmada adeegyada, waxay idiin hayaan little mendez. So [facility] [phone].    ATENCIÓN: Si habla español, tiene a candelaria disposición servicios gratuitos de asistencia lingüística. Llame al [phone].    We comply with applicable federal civil rights laws and [place] laws. We do not discriminate on the basis of race, color, national origin, age, disability, sex, sexual orientation, or gender identity.            Thank you!     Thank you for choosing  [facility]  for your care. Our goal is always to provide you with excellent care. Hearing back from our patients is one way we can continue to improve our services. Please take a few minutes to complete the written survey that you may receive in the mail after your visit with us. Thank you!             Your Updated Medication List - Protect others around you: Learn how to safely use, store and throw away your medicines at www.disposemymeds.org.          This list is accurate as of 5/22/18  2:02 PM.  Always use your most recent med list.                   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 NO MORE THEN 1 FULL TABLET PER DAY    Erectile dysfunction, unspecified erectile dysfunction type

## 2018-05-22 NOTE — LETTER
ANAPHYLAXIS EMERGENCY CARE PLAN         Name: Jon DANIELS Deirdre HANDY.:  [id number]    Allergy to: Venom  Weight: 227 lbs 8 oz lbs.  Asthma:  No    -NOTE: Do not depend on antihistamines or inhalers (bronchodilators) to treat a severe reaction. USE EPINEPHRINE.     MEDICATIONS/DOSES  Epinephrine Brand: Auvi Q  Epinephrine Dose: 0.3 mg IM  Antihistamine Brand or Generic: Zyrtec (Cetirizine)  Antihistamine Dose: 10mg         ANAPHYLAXIS EMERGENCY CARE PLAN            EMERGENCY CONTACTS - CALL 911  DOCTOR:  Kamaljit Vazquez DO   PHONE: [phone]  PARENT/GUARDIAN:              PHONE:  OTHER EMERGENCY CONTACTS  NAME/RELATIONSHIP:   PHONE:   NAME/RELATIONSHIP:    PHONE:           PARENT/GUARDIAN AUTHORIZATION SIGNATURE     DATE              PHYSICIAN/H CP AUTHORIZATION SIGNATURE         DATE  FORM PROVIDED COURTESY OF [employer] (FARE) ([email or web address]) 2014

## 2018-05-22 NOTE — PATIENT INSTRUCTIONS
Allergy Staff Appt Hours Shot Hours Locations    Physician     Kamaljit Vazquez DO       Support Staff     Thelma ESTRADA, RN      Dana ESTRADA, [facility]  Monday:                      [place] 8-7     Tuesday:         [place] 8-5     Wednesday:        [place]: 7-5     Friday:        [place] 7-5   [place]        Monday: 9-5:50        Wednesday: 2-5:50        Friday: 7-12:50     [place]        Tuesday: 7-10:50        Thursday: 1:30-6:30        Friday: 8:00-3:50     [place] Monday: 7:10-4:50        Tuesday: 12:30-6:30        Thursday: 7-11:50 [facility]  [address] [place] [address]  Appt Line: [phone]  Allergy RN (Monday):  [phone]    [facility]  [address] [place] [address]  Appt Line: [phone]  Allergy RN (Tues & Wed):  [phone]    [facility]  [address] [place] [address]  Appt Line: [phone]  Allergy RN (Friday):  [phone]       Important Scheduling Information  Aspirin Desensitization: Appt will last 2 clinic days. Please call the Allergy RN line for your clinic to schedule. Discontinue antihistamines 7 days prior to the appointment.     Food Challenges: Appt will last 3-4 hours. Please call the Allergy RN line for your clinic to schedule. Discontinue antihistamines 7 days prior to the appointment.     Penicillin Testing: Appt will last 2-3 hours. Please call the Allergy RN line for your clinic to schedule. Discontinue antihistamines 7 days prior to the appointment.     Skin Testing: Appt will about 40 minutes. Call the appointment line for your clinic to schedule. Discontinue antihistamines 7 days prior to the appointment.     Venom Testing: Appt will last 2-3 hours. Please call the Allergy RN line for your clinic to schedule. Discontinue antihistamines 7 days prior to the appointment.     Thank you for trusting us with your Allergy, Asthma, and Immunology care. Please feel free to contact us with any questions or concerns you may  have.      - Ketotifen (Zaditor, Alaway) 1 drop/eye twice daily as needed.   - Zyrtec or Allegra daily as needed.   - Blood testing today.   - Consider venom allergy shots.   - Avoid insect stings.     AEROALLERGEN AVOIDANCE INSTRUCTIONS  MOLD  Indoors, mold season is year round. Outdoors, most mold prefer seasons with high humidity. Mold prefers damp, dark, warm places. Here are some tips on how to avoid mold exposure.  1.  Keep humidity inside between 35-50% with air conditioning or dehumidifier. The humidity level can be checked with a meter from a hardware store.   2.  Clean surfaces where mold grows and dry wet areas.  3.  Avoid steam cleaning carpets and discard moldy belongings.  4.  Wear a mask when doing yard work and refrain from walking through uncut fields or playing in leaves.  5.  Minimize use of potted plants and do not keep them indoors.  6.  Consider an allergy cover for the pillow and mattress.  POLLEN  Pollens are the tiny airborne particles given off by trees, weeds, and grasses. They can be the cause of seasonal allergic rhinitis or hay fever symptoms, which include stuffy, itchy, runny nose, redness, swelling and itching of the eyes, and itching of the ears and throat. Here are some tips on how to avoid pollen exposure.  1. .Keep windows closed and use the air conditioner when possible.  2.  Avoid outside exposure in the early morning as pollen counts are highest at that time.  3.  Take a shower and wash hair each night.  4.  Consider wearing a mask when working in the yard and/or garden.  5.  Clean furnace filter monthly with HEPA filters. Consider a HEPA filter vacuum  which will prevent pollen from being reintroduced into the air.   DUST MITES  Dust mites can never be entirely eliminated in the house no matter how clean your house is. Dust mites are attracted to warm, moist areas and feed on dead skin flakes. Here are tips to minimize dust mites in your home.  1.  Encase pillows and  mattress/box springs in zippered allergy covers.  2.  Wash bedding in hot water (at least 130 F) every 7-14 days.  3.  Avoid curtains, carpet, and upholstered furniture if possible.  4.  Use HEPA air filters and a HEPA filter vacuum . Change filters monthly. Vacuum weekly.  5.  Keep bedroom simple, avoiding clutter, so it can quickly be dusted.  6.  Cover heating vents with vent filters.  7.  Keep stuffed toys in a closed container and wash or freeze regularly.  8.  Keep clothing in the closet with the door closed.  PETS  Pets present many problems for people with allergies. Dander from pets is very difficult to remove and also is a food source for dust mites.  1.  If possible, find the pet a new home.  2.  If not possible, keep the pet outdoors. Never allow the pet into the bedroom.  3.  Wash pet weekly in warm water.  4.  Encase mattresses, pillows, and box springs in allergen-proof covers.  5.  Use HEPA air filters and a HEPA filter vacuum . Change filters monthly.

## 2018-05-22 NOTE — ASSESSMENT & PLAN NOTE
Spring nasal and ocular symptoms.     Skin testing:  Positive for cat, dog, dust mite, trees, molds.    - Ketotifen (Zaditor, Alaway) 1 drop/eye twice daily as needed.   - Allegra or Zyrtec daily as needed.   - Allergen avoidance measures discussed and literature provided.

## 2018-05-22 NOTE — ASSESSMENT & PLAN NOTE
History of anaphylaxis after Prem insect sting approximately 12 years ago.  No subsequent stings.  No history of venom testing.  No history of venom immunotherapy.  Next    -Serum IgE for yellow jacket, white faced hornet, yellow faced hornet, honeybee and wasp.  -If positive testing would recommend venom immunotherapy.  If negative testing he needs follow-up venom skin testing when this comes off back order.  -Continue to avoid insect stings.  -An anaphylaxis action plan was given and reviewed with patient and family.   -Injectable epinephrine use was reviewed and demonstrated. The patient will need to carry injectable epinephrine.   -Injectable epinephrine script provided.

## 2018-05-23 ENCOUNTER — TELEPHONE (OUTPATIENT)
Dept: FAMILY MEDICINE | Facility: CLINIC | Age: 38
End: 2018-05-23

## 2018-05-23 DIAGNOSIS — N52.9 ERECTILE DYSFUNCTION, UNSPECIFIED ERECTILE DYSFUNCTION TYPE: ICD-10-CM

## 2018-05-23 NOTE — LETTER
May 30, 2018      Jon Gilmore  [address] [facility] [address]        Dear Jon,     We have been unable to reach you be telephone. Your usual Viagra prescription has been declined by your insurance and generic equivalent was recommended.  The generic tablets come in 20 mg sizes (instead of your usual 100 mg sized pills), so your prescription was modified to reflect this different sized tablet.      Sincerely,        Ced Herrera MD

## 2018-05-24 NOTE — TELEPHONE ENCOUNTER
Central Prior Authorization Team   Phone: [phone]    PA Initiation    Medication: Sildenafil Citrate  Insurance Company: [employer] - Phone [phone] Fax [phone]  Pharmacy Filling the Rx: [facility] [address] [place] [address] - [address]  Filling Pharmacy Phone: [phone]  Filling Pharmacy Fax:    Start Date: 5/24/2018

## 2018-05-25 LAB
DEPRECATED MISC ALLERGEN IGE RAST QL: NORMAL
RESULT: NORMAL
SEND OUTS MISC TEST CODE: NORMAL
SEND OUTS MISC TEST SPECIMEN: NORMAL
TEST NAME: NORMAL

## 2018-05-25 NOTE — TELEPHONE ENCOUNTER
PRIOR AUTHORIZATION DENIED    Medication: Sildenafil Citrate    Denial Date: 5/25/2018    Denial Rational:  Patient must try/fail sildenafil 20 mg tablets        Appeal Information:  If provider would like to appeal please provide a letter of medical necessity and route back to PA team.

## 2018-05-29 LAB
HONEY BEE IGE QN: <0.1 KU(A)/L
MISCELLANEOUS TEST: NORMAL
YELLOW HORNET IGE QN: <0.1 KU(A)/L

## 2018-05-29 RX ORDER — SILDENAFIL CITRATE 20 MG/1
TABLET ORAL
Qty: 50 TABLET | Refills: 11 | Status: SHIPPED | OUTPATIENT
Start: 2018-05-29 | End: 2020-04-30

## 2018-05-29 NOTE — TELEPHONE ENCOUNTER
Will have staff notify patient that his usual Viagra prescription has been declined by his insurance and generic equivalent was recommended.  The generic tablets come in 20 mg sizes (instead of his usual 100 mg sized pills), so his prescription was modified to reflect this different sized tablet.    Ced Herrera MD

## 2018-05-30 NOTE — TELEPHONE ENCOUNTER
Jon returns call and message is relayed.  Jon states understanding and had no other questions or concerns.

## 2018-05-31 LAB — WHITEFACED HORNET IGE QN: <0.1 KU(A)/L

## 2018-06-01 ENCOUNTER — TELEPHONE (OUTPATIENT)
Dept: ALLERGY | Facility: CLINIC | Age: 38
End: 2018-06-01

## 2018-06-01 DIAGNOSIS — T78.2XXD ANAPHYLAXIS DUE TO HYMENOPTERA VENOM, ACCIDENTAL OR UNINTENTIONAL, SUBSEQUENT ENCOUNTER: ICD-10-CM

## 2018-06-01 DIAGNOSIS — T63.481D ANAPHYLAXIS DUE TO HYMENOPTERA VENOM, ACCIDENTAL OR UNINTENTIONAL, SUBSEQUENT ENCOUNTER: ICD-10-CM

## 2018-06-01 LAB — PAPER WASP IGE QN: <0.1 KU(A)/L

## 2018-06-01 RX ORDER — EPINEPHRINE 0.3 MG/.3ML
0.3 INJECTION SUBCUTANEOUS PRN
Qty: 2 ML | Refills: 0 | Status: SHIPPED | OUTPATIENT
Start: 2018-06-01 | End: 2018-06-01

## 2018-06-01 RX ORDER — EPINEPHRINE 0.3 MG/.3ML
0.3 INJECTION SUBCUTANEOUS PRN
Qty: 4 ML | Refills: 0 | Status: SHIPPED | OUTPATIENT
Start: 2018-06-01

## 2018-06-01 NOTE — PROGRESS NOTES
Venom allergy testing is negative.  Continue to carry EpiPen.  When venom skin testing is available this will need to be arranged and completed.  We will contact you when venom available. Thanks and please add to venom skin testing list.     Dr. Vazquez

## 2018-06-01 NOTE — TELEPHONE ENCOUNTER
Signed Prescriptions:                        Disp   Refills    EPINEPHrine (AUVI-Q) 0.3 MG/0.3ML injectio*2 mL   0        Sig: Inject 0.3 mLs (0.3 mg) into the muscle as needed for           anaphylaxis  Authorizing Provider: ENE HENRY  Ordering User: MICHELL CRUZ RN refilled medication for two 2-packs as requested by patient per [facility] Refill Protocol.     Michell Cruz RN

## 2018-06-01 NOTE — TELEPHONE ENCOUNTER
Pharmacy calling. They received a prescription for Auvi-Q for 2 injectors (1 carton) patient is requesting 4 injectors (2 cartons)

## 2018-06-05 ENCOUNTER — TELEPHONE (OUTPATIENT)
Dept: ALLERGY | Facility: OTHER | Age: 38
End: 2018-06-05

## 2018-06-05 NOTE — TELEPHONE ENCOUNTER
RN left message for patient to return call to RN's direct line @ [phone] regarding result note from Dr. Vazquez:    Kamaljit Vazquez,   [facility] Patient Care Pool                   Venom allergy testing is negative.  Continue to carry EpiPen.  When venom skin testing is available this will need to be arranged and completed.  We will contact you when venom available. Thanks and please add to venom skin testing list.         Thelma Cruz RN

## 2018-06-05 NOTE — TELEPHONE ENCOUNTER
RN spoke with patient regarding lab results. patient verbalized understanding. No further questions or concerns.     Thelma Cruz RN

## 2018-06-12 ENCOUNTER — OFFICE VISIT (OUTPATIENT)
Dept: FAMILY MEDICINE | Facility: CLINIC | Age: 38
End: 2018-06-12
Payer: COMMERCIAL

## 2018-06-12 VITALS
RESPIRATION RATE: 16 BRPM | BODY MASS INDEX: 31.22 KG/M2 | OXYGEN SATURATION: 95 % | TEMPERATURE: 96.9 F | DIASTOLIC BLOOD PRESSURE: 76 MMHG | HEART RATE: 64 BPM | SYSTOLIC BLOOD PRESSURE: 122 MMHG | WEIGHT: 227 LBS

## 2018-06-12 DIAGNOSIS — D22.9 ATYPICAL MOLE: ICD-10-CM

## 2018-06-12 DIAGNOSIS — L91.8 SKIN TAG: Primary | ICD-10-CM

## 2018-06-12 DIAGNOSIS — B07.0 PLANTAR WART: ICD-10-CM

## 2018-06-12 PROCEDURE — 17110 DESTRUCTION B9 LES UP TO 14: CPT | Performed by: OBSTETRICS & GYNECOLOGY

## 2018-06-12 PROCEDURE — 11200 RMVL SKIN TAGS UP TO&INC 15: CPT | Mod: 59 | Performed by: OBSTETRICS & GYNECOLOGY

## 2018-06-12 PROCEDURE — 99213 OFFICE O/P EST LOW 20 MIN: CPT | Mod: 25 | Performed by: OBSTETRICS & GYNECOLOGY

## 2018-06-12 ASSESSMENT — PAIN SCALES - GENERAL: PAINLEVEL: NO PAIN (0)

## 2018-06-12 NOTE — MR AVS SNAPSHOT
After Visit Summary   6/12/2018    Jon Gilmore    MRN: [id number]           Patient Information     Date Of Birth          1980        Visit Information        Provider Department      6/12/2018 8:50 AM Saji Monzon MD [facility]         Follow-ups after your visit        Your next 10 appointments already scheduled     Jun 13, 2018  8:00 PM CDT   PSG Split with PH BED 2   [facility] ([facility])    [address] [place] [address] [id number] [phone]              Who to contact     If you have questions or need follow up information about today's clinic visit or your schedule please contact [facility] directly at [phone].  Normal or non-critical lab and imaging results will be communicated to you by MyChart, letter or phone within 4 business days after the clinic has received the results. If you do not hear from us within 7 days, please contact the clinic through Heyzaphart or phone. If you have a critical or abnormal lab result, we will notify you by phone as soon as possible.  Submit refill requests through [employer] or call your pharmacy and they will forward the refill request to us. Please allow 3 business days for your refill to be completed.          Additional Information About Your Visit        MyChart Information     [employer] gives you secure access to your electronic health record. If you see a primary care provider, you can also send messages to your care team and make appointments. If you have questions, please call your primary care clinic.  If you do not have a primary care provider, please call [phone] and they will assist you.        Care EveryWhere ID     This is your Care EveryWhere ID. This could be used by other organizations to access your [place] medical records  [id number]        Your Vitals Were     Pulse Temperature Respirations Pulse Oximetry BMI (Body Mass  Index)       64 96.9  F (36.1  C) (Temporal) 16 95% 31.22 kg/m2        Blood Pressure from Last 3 Encounters:   06/12/18 122/76   05/22/18 106/70   05/17/18 127/64    Weight from Last 3 Encounters:   06/12/18 227 lb (103 kg)   05/22/18 227 lb 8 oz (103.2 kg)   05/17/18 229 lb (103.9 kg)              Today, you had the following     No orders found for display       Primary Care Provider Office Phone # Fax #    Saji Monzon [phone] [address] [place] [address]        Equal Access to Services     BEN HANNA : Leticia mccoy Soeaston, waobed parks, qaybta kaalmada adebear, patel mendez. So [facility] [phone].    ATENCIÓN: Si habla español, tiene a candelaria disposición servicios gratuitos de asistencia lingüística. Llame al [phone].    We comply with applicable federal civil rights laws and [place] laws. We do not discriminate on the basis of race, color, national origin, age, disability, sex, sexual orientation, or gender identity.            Thank you!     Thank you for choosing [facility]  for your care. Our goal is always to provide you with excellent care. Hearing back from our patients is one way we can continue to improve our services. Please take a few minutes to complete the written survey that you may receive in the mail after your visit with us. Thank you!             Your Updated Medication List - Protect others around you: Learn how to safely use, store and throw away your medicines at www.disposemymeds.org.          This list is accurate as of 6/12/18  9:17 AM.  Always use your most recent med list.                   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 NO MORE THEN 1 FULL TABLET PER DAY    Erectile dysfunction, unspecified erectile dysfunction type

## 2018-06-12 NOTE — PROGRESS NOTES
Subjective:  He has multiple moles that he is concerned about and also a wart on his left foot and a skin tag in his right axilla.      The past medical history, social history, past surgical history and family history as shown below have been reviewed by me today.  History reviewed. No pertinent past medical history.     Allergies   Allergen Reactions     Bee Anaphylaxis     Cats Unknown     Itchey eyes, an runny nose     Current Outpatient Prescriptions   Medication Sig Dispense Refill     EPINEPHrine (AUVI-Q) 0.3 MG/0.3ML injection 2-pack Inject 0.3 mLs (0.3 mg) into the muscle as needed for anaphylaxis 4 mL 0     ketotifen (ZADITOR/REFRESH ANTI-ITCH) 0.025 % SOLN ophthalmic solution Place 1 drop into both eyes 2 times daily 1 Bottle 3     sildenafil (REVATIO) 20 MG tablet TAKE 2-5 TABLETS BY MOUTH 30 MINUTES BEFORE INTERCOURSE 50 tablet 11     VIAGRA 100 MG tablet TAKE 0.5-1 TABLET BY MOUTH 30 MINUTES BEFORE INTERCOURSE. 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 No     Social History Narrative     Family History   Problem Relation Age of Onset     Depression Father        ROS: A 12 point review of systems was done. Except for what is listed above in the HPI, the systems review is negative .      Objective: Vital signs: Blood pressure 122/76, pulse 64, temperature 96.9  F  (36.1  C), temperature source Temporal, resp. rate 16, weight 227 lb (103 kg), SpO2 95 %.    The moles on his back are 8 mm wide and deep brown color and irregular.  He has 2 that size and one that is 6 mm.  I recommended that he see associated skin care to have them the use of the dermatoscope to further evaluate these before making a decision about removal.  I gave him the number to call.    In his right axilla he has a small skin tag and asked me to cut this off.  He declined anesthesia.  I scrubbed it with alcohol and used a curved iris scissors and cut it off.  It was an obvious skin Tag and so it was not sent for pathology      The wart on his left foot was pared down with a sterile scalpel and then treated with liquid nitrogen freeze thaw cycle ×3.  He will recheck in 3 weeks if the wart persists.        Assessment/Plan:A total of 15 minutes were spent face-to-face with this patient during today's consultation, with more than 50% of that time devoted to conversation and counseling about the management decisions.      1.  Multiple moles on the back-advised him to see associated skin care and gave him the number to call    2.  Skin tag right axilla-removed see above    3.  Wart on left foot treated with liquid nitrogen        DARCI Monzon MD

## 2018-06-13 ENCOUNTER — THERAPY VISIT (OUTPATIENT)
Dept: SLEEP MEDICINE | Facility: CLINIC | Age: 38
End: 2018-06-13
Payer: COMMERCIAL

## 2018-06-13 DIAGNOSIS — R06.83 SNORING: ICD-10-CM

## 2018-06-13 PROCEDURE — 95810 POLYSOM 6/> YRS 4/> PARAM: CPT | Performed by: OTOLARYNGOLOGY

## 2018-06-13 NOTE — MR AVS SNAPSHOT
After Visit Summary   6/13/2018    Jon Gilmore    MRN: [id number]           Patient Information     Date Of Birth          1980        Visit Information        Provider Department      6/13/2018 8:00 PM PH BED 2 [facility]        Today's Diagnoses     Snoring           Follow-ups after your visit        Who to contact     If you have questions or need follow up information about today's clinic visit or your schedule please contact [facility] directly at [phone].  Normal or non-critical lab and imaging results will be communicated to you by Brevityhart, letter or phone within 4 business days after the clinic has received the results. If you do not hear from us within 7 days, please contact the clinic through [employer] or phone. If you have a critical or abnormal lab result, we will notify you by phone as soon as possible.  Submit refill requests through [employer] or call your pharmacy and they will forward the refill request to us. Please allow 3 business days for your refill to be completed.          Additional Information About Your Visit        MyChart Information     [employer] gives you secure access to your electronic health record. If you see a primary care provider, you can also send messages to your care team and make appointments. If you have questions, please call your primary care clinic.  If you do not have a primary care provider, please call [phone] and they will assist you.        Care EveryWhere ID     This is your Care EveryWhere ID. This could be used by other organizations to access your [place] medical records  [id number]         Blood Pressure from Last 3 Encounters:   06/12/18 122/76   05/22/18 106/70   05/17/18 127/64    Weight from Last 3 Encounters:   06/12/18 103 kg (227 lb)   05/22/18 103.2 kg (227 lb 8 oz)   05/17/18 103.9 kg (229 lb)              We Performed the Following     Comprehensive Sleep Study        Primary  Care Provider Office Phone # Fax #    Saji Monzon [phone] [address] [place] [address]        Equal Access to Services     BEN HANNA : Hadii teresa ku treo Soservandoali, waaxda luqadaha, qaybta kaalmada adebear, patel bledsoejunior mendez. So [facility] [phone].    ATENCIÓN: Si habla español, tiene a candelaria disposición servicios gratuitos de asistencia lingüística. Llame al [phone].    We comply with applicable federal civil rights laws and [place] laws. We do not discriminate on the basis of race, color, national origin, age, disability, sex, sexual orientation, or gender identity.            Thank you!     Thank you for choosing [place] [facility]  for your care. Our goal is always to provide you with excellent care. Hearing back from our patients is one way we can continue to improve our services. Please take a few minutes to complete the written survey that you may receive in the mail after your visit with us. Thank you!             Your Updated Medication List - Protect others around you: Learn how to safely use, store and throw away your medicines at www.disposemymeds.org.          This list is accurate as of 6/13/18 11:59 PM.  Always use your most recent med list.                   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 NO MORE THEN 1 FULL TABLET PER DAY    Erectile dysfunction, unspecified erectile dysfunction type

## 2018-06-18 LAB — SLPCOMP: NORMAL

## 2018-06-19 ENCOUNTER — OFFICE VISIT (OUTPATIENT)
Dept: SLEEP MEDICINE | Facility: CLINIC | Age: 38
End: 2018-06-19
Payer: COMMERCIAL

## 2018-06-19 DIAGNOSIS — G47.33 OSA (OBSTRUCTIVE SLEEP APNEA): Primary | ICD-10-CM

## 2018-06-19 DIAGNOSIS — G47.19 EXCESSIVE DAYTIME SLEEPINESS: ICD-10-CM

## 2018-06-19 PROCEDURE — 99213 OFFICE O/P EST LOW 20 MIN: CPT | Performed by: OTOLARYNGOLOGY

## 2018-06-19 NOTE — PROGRESS NOTES
Sleep Study Follow-Up Visit:    Date on this visit: 6/19/2018    oJn Gilmore comes in today for follow-up of his sleep study done on 6/13/18 at the [facility] for loud snoring, unrefreshed sleep and possible sleep apnea.    Sleep latency 56 minutes without Ambien.  REM achieved.   REM latency 79.5 minutes.  Sleep efficiency 82.5%. Total sleep time 403 minutes.    Sleep architecture:  Stage 1, 4% (5%), stage 2, 42% (45-55%), stage 3, 31% (15-20%), stage REM, 23% (20-25%).  AHI was 5.8, without desaturations. RDI 8.5.  REM RDI 23, consistent with moderate REM MYRTLE.  Supine RDI 6.7, consistent with mild SUPINE MYRTLE.  Periodic Limb Movement Index 3.1/hour.       CPAP titration:  The patient did not meet criteria for CPAP titration..       These findings were reviewed with patient.     Past medical/surgical history, family history, social history, medications and allergies were reviewed.      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, accidental or unintentional, subsequent encounter 05/22/2018     Priority: Medium     Allergic rhinitis due to dust mite 05/22/2018     Priority: Medium     Allergic rhinitis due to animal dander 05/22/2018     Priority: Medium     Allergic rhinitis due to mold 05/22/2018     Priority: Medium     Old complete tear of anterior cruciate ligament of right knee 08/11/2016     Priority: Medium     Chondromalacia of left knee 08/11/2016     Priority: Medium     CARDIOVASCULAR SCREENING; LDL GOAL LESS THAN 160 10/31/2010     Priority: Medium        Impression/Plan:    Due to patient having symptomatic mild MYRTLE we discuss treatment options including CPAP and dental appliance.The patient is interested in the trial of mandibular advancement device.  He will follow up with me in about 2 month(s).     Fifteen minutes spent  with patient, all of which were spent face-to-face counseling, consulting, coordinating plan of care.      Chas Aldridge MD      CC: Saji Monzon

## 2018-06-19 NOTE — MR AVS SNAPSHOT
After Visit Summary   6/19/2018    Jon Gilmore    MRN: [id number]           Patient Information     Date Of Birth          1980        Visit Information        Provider Department      6/19/2018 3:45 PM Chas Aldridge MD [facility]        Today's Diagnoses     MYRTLE (obstructive sleep apnea)    -  1    Excessive daytime sleepiness           Follow-ups after your visit        Additional Services     SLEEP DENTAL REFERRAL       Dental appliance resources recommended by [facility]     Below is a list of dental appliance resources recommended by [facility]   If you wish to choose your own dental sleep dentist, you may identify a provider close to you: [email or web address]    [facility]   Sleep Medicine [facility]   Tenzin Magdaleno DDS, [address]  [place] [address]   Appointments: [phone]  Fax: [phone]   Email: [email or web address]   [facility]ental [facility] Oral Surgery Clinic   MATT Orellana DDS   [address]   [place] [address]   Appointments: [phone]   Website: [email or web address]    Snoring and Sleep Apnea   [facility] Center   ANA MARIA Treviño DDS  [address]   [place] [address]   Appointments: [phone]   Fax: [phone]   Email: [email or web address]  Website: [email or web address]     [place] [facility]   Office 1   Dillon Solis DDS - [DME Medicare]  [address], [address]   [place] [address]  Appointments: [phone]  Fax: [phone]  Website: [employer]    [place] [facility]   Office 2  Dillon Solis DDS - [DME Medicare]  [address], [address]  [place] [address]  Appointments: [phone]  Fax: [phone]  Website: [employer]    [facility]  Elan CoronaANA MARIA  [address] [place] [address]  Appointments: [phone]    [facility]   [place] Office   Wojciech Lazo DDS   [address]   [place] [address]   Appointments: [phone]   Fax: [phone]   Website: [employer]     [facility]   [place] Office   Javier Liang DDS, MS - [DME Medicare]  [place] [address]   [place] [address]   Appointments: [phone]   Fax: [phone]   Website: [employer]     [facility]  Сергей Jesus DMD, MSD - [DME Medicare]  [address], [address]  [place] [address]  Appointments [phone]  Fax: [phone]  Website: [employer]    [facility]   [place] Office   Ioana Webb DDS, PhD, [facility]   [address]   [place] [address]   Appointments: [phone]   Fax: [phone]   Website: [employer]     [facility]   [place] Office   Ioana Webb DDS, PhD, MS   [facility] [place]   [address]   [place] [address]   Appointments: [phone]   Fax: [phone]   Website: [employer]     [facility]  Kinsey Hopper DDS  [facility]  [address] [place] [address]  Appointments: [phone]   Fax: [phone]    If you wish to choose your own dental sleep dentist, you may identify a provider close to you: [email or web address]      AHI: 5.8 PSG DATE: 5.8     Return to clinic in 2 months for Home Sleep Test to confirm adequacy of Treatment.    Other information regarding this referral: Mandibular repositioning appliance for MYRTLE. If your insurance asks for a CPT code, it is  .    Please be aware that coverage of these services is subject to the terms and limitations of your health insurance plan.  Call member services at your health plan with any benefit or coverage questions.      Please bring the following to your appointment:    >>   List of current medications   >>   This referral request   >>   Any documents/labs given to you for this referral                  Who to contact     If you have questions or need follow up information about today's clinic visit or your schedule please contact [facility] directly at [phone].  Normal or non-critical lab and imaging results will be communicated to you by Medlerthart, letter or phone within 4 business days after the clinic has received the results. If you do not hear from us within 7 days, please contact the clinic through [employer] or phone. If you have a critical or abnormal lab result, we will notify you by phone as soon as possible.  Submit refill requests through [employer] or call your pharmacy and they will forward the refill request to us. Please allow 3 business days for your refill to be completed.          Additional Information About Your Visit        [employer] Information     [employer] gives you secure access to your electronic health record. If you see a primary care provider, you can also send messages to your care team and make appointments. If you have questions, please call your primary care clinic.  If you do not have a primary care provider, please call [phone] and they will assist you.        Care EveryWhere ID     This is your Care EveryWhere ID. This could be used by other organizations to access your [place] medical records  [id number]         Blood Pressure from Last 3 Encounters:   06/12/18 122/76   05/22/18 106/70   05/17/18 127/64    Weight from Last 3 Encounters:   06/12/18 103 kg (227 lb)   05/22/18 103.2 kg (227 lb 8 oz)   05/17/18 103.9 kg (229 lb)              We Performed the  Following     SLEEP DENTAL REFERRAL        Primary Care Provider Office Phone # Fax #    Saji Monzon [phone]       [address] [place] [address]        Equal Access to Services     BEN HANNA : Haditz teresa mckeon treo Soeaston, waaxda luqadaha, qaybta kaalmada adeegyada, patel davis laVenitajunior mendez. So [facility] [phone].    ATENCIÓN: Si habla español, tiene a candelaria disposición servicios gratuitos de asistencia lingüística. Llame al [phone].    We comply with applicable federal civil rights laws and [place] laws. We do not discriminate on the basis of race, color, national origin, age, disability, sex, sexual orientation, or gender identity.            Thank you!     Thank you for choosing [place] [facility]  for your care. Our goal is always to provide you with excellent care. Hearing back from our patients is one way we can continue to improve our services. Please take a few minutes to complete the written survey that you may receive in the mail after your visit with us. Thank you!             Your Updated Medication List - Protect others around you: Learn how to safely use, store and throw away your medicines at www.disposemymeds.org.          This list is accurate as of 6/19/18  4:22 PM.  Always use your most recent med list.                   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 NO MORE THEN 1 FULL TABLET PER DAY    Erectile dysfunction, unspecified erectile dysfunction type

## 2018-07-03 NOTE — PROGRESS NOTES
ENT Consultation    Jon Gilmore is a 37 year old male who is seen in consultation at the request of .      History of Present Illness - Jon Gilmore is a 37 year old male here today with a deviated septum.   Patient concerned about the possibility of deviated septum causing some of his nasal congestion obstruction.  He is tried medical therapy in the past with nasal sprays with minimal results.  Denies any significant allergies seasonal perennial.  The patient had previously been diagnosed with very mild obstructive sleep apnea AHI 5.8 certainly he is trying to treat it but is not able to tolerate CPAP very well.  Body mass index is 31.91 kg/(m^2).        BP Readings from Last 1 Encounters:   06/12/18 122/76       BP noted to be well controlled today in office.     Jon IS NOT a smoker/uses chewing tobacco.        Past Medical History - No past medical history on file.    Current Medications -   Current Outpatient Prescriptions:      EPINEPHrine (AUVI-Q) 0.3 MG/0.3ML injection 2-pack, Inject 0.3 mLs (0.3 mg) into the muscle as needed for anaphylaxis, Disp: 4 mL, Rfl: 0     ketotifen (ZADITOR/REFRESH ANTI-ITCH) 0.025 % SOLN ophthalmic solution, Place 1 drop into both eyes 2 times daily, Disp: 1 Bottle, Rfl: 3     sildenafil (REVATIO) 20 MG tablet, TAKE 2-5 TABLETS BY MOUTH 30 MINUTES BEFORE INTERCOURSE, Disp: 50 tablet, Rfl: 11     VIAGRA 100 MG tablet, TAKE 0.5-1 TABLET BY MOUTH 30 MINUTES BEFORE INTERCOURSE. NO MORE THEN 1 FULL TABLET PER DAY, Disp: 6 tablet, Rfl: 0    Allergies -   Allergies   Allergen Reactions     Bee Anaphylaxis     Cats Unknown     Itchey eyes, an runny nose       Social History -   Social History     Social History     Marital status:      Spouse name: Steven     Number of children: 1     Years of education: N/A     Occupational History      [employer]     Social History Main Topics     Smoking status: Never Smoker     Smokeless tobacco: Never Used      Alcohol use 0.0 oz/week     0 Standard drinks or equivalent per week      Comment: occ     Drug use: No     Sexual activity: Yes     Partners: Female     Other Topics Concern     Parent/Sibling W/ Cabg, Mi Or Angioplasty Before 65f 55m? No     Social History Narrative       Family History -   Family History   Problem Relation Age of Onset     Depression Father        Review of Systems - As per HPI and PMHx, otherwise review of system review of the head and neck negative.    Physical Exam  There were no vitals taken for this visit.  BMI: There is no height or weight on file to calculate BMI.    General - The patient is well nourished and well developed, and appears to have good nutritional status.  Alert and oriented to person and place, answers questions and cooperates with examination appropriately.    SKIN - No suspicious lesions or rashes.  Respiration - No respiratory distress.     Head and Face - Normocephalic and atraumatic, with no gross asymmetry noted of the contour of the facial features.  The facial nerve is intact, with strong symmetric movements.    Voice and Breathing - The patient was breathing comfortably without the use of accessory muscles. There was no wheezing, stridor, or stertor.  The patients voice was clear and strong, and had appropriate pitch and quality.    Ears - Bilateral pinna and EACs with normal appearing overlying skin. Tympanic membrane intact with good mobility on pneumatic otoscopy bilaterally. Bony landmarks of the ossicular chain are normal. The tympanic membranes are normal in appearance. No retraction, perforation, or masses.  No fluid or purulence was seen in the external canal or the middle ear.     Eyes - Extraocular movements intact.  Sclera were not icteric or injected, conjunctiva were pink and moist.    Mouth - Examination of the oral cavity showed pink, healthy oral mucosa. No lesions or ulcerations noted.  The tongue was mobile and midline, and the dentition were in  good condition.  Patient has class II occlusion with minimal overbite.  Overall dentition is in a state of repair.    Throat - The walls of the oropharynx were smooth, pink, moist, symmetric, and had no lesions or ulcerations.  The tonsillar pillars and soft palate were symmetric.  The uvula was midline on elevation.    Neck - Normal midline excursion of the laryngotracheal complex during swallowing.  Full range of motion on passive movement.  Palpation of the occipital, submental, submandibular, internal jugular chain, and supraclavicular nodes did not demonstrate any abnormal lymph nodes or masses.  The carotid pulse was palpable bilaterally.  Palpation of the thyroid was soft and smooth, with no nodules or goiter appreciated.  The trachea was mobile and midline.    Nose - External contour is symmetric, no gross deflection or scars.  Nasal mucosa is pink and moist with no abnormal mucus.  The septum was midline and non-obstructive, turbinates of larger  size and position.  No polyps, masses, or purulence noted on examination.    Neuro - Nonfocal neuro exam is normal, CN 2 through 12 intact, normal gait and muscle tone.    Performed in clinic today:  No procedures preformed in clinic today          A/P - Jon Gilmore is a 37 year old male with mild obstructive sleep apnea snoring will consider dental appliances option.  He was debrided.  Respiratory his nose and turbinates appear to be the main culprit.  We discussed turbinate reduction via the turbinoplasty of SMR turbinates which she will consider.  This could be done in the office setting.      Chas Aldridge MD

## 2018-07-09 ENCOUNTER — OFFICE VISIT (OUTPATIENT)
Dept: OTOLARYNGOLOGY | Facility: CLINIC | Age: 38
End: 2018-07-09
Payer: COMMERCIAL

## 2018-07-09 VITALS
DIASTOLIC BLOOD PRESSURE: 70 MMHG | BODY MASS INDEX: 31.91 KG/M2 | WEIGHT: 232 LBS | OXYGEN SATURATION: 99 % | HEART RATE: 94 BPM | SYSTOLIC BLOOD PRESSURE: 120 MMHG

## 2018-07-09 DIAGNOSIS — J34.3 NASAL TURBINATE HYPERTROPHY: Primary | ICD-10-CM

## 2018-07-09 DIAGNOSIS — G47.33 OSA (OBSTRUCTIVE SLEEP APNEA): ICD-10-CM

## 2018-07-09 PROCEDURE — 99203 OFFICE O/P NEW LOW 30 MIN: CPT | Performed by: OTOLARYNGOLOGY

## 2018-07-09 NOTE — LETTER
7/9/2018         RE: Jon Gilmore  [address] [facility] [address]        Dear Colleague,    Thank you for referring your patient, Jon Gilmore, to the [facility]. Please see a copy of my visit note below.    ENT Consultation    Jon Gilmore is a 37 year old male who is seen in consultation at the request of .      History of Present Illness - Jon Gilmore is a 37 year old male here today with a deviated septum.   Patient concerned about the possibility of deviated septum causing some of his nasal congestion obstruction.  He is tried medical therapy in the past with nasal sprays with minimal results.  Denies any significant allergies seasonal perennial.  The patient had previously been diagnosed with very mild obstructive sleep apnea AHI 5.8 certainly he is trying to treat it but is not able to tolerate CPAP very well.  Body mass index is 31.91 kg/(m^2).        BP Readings from Last 1 Encounters:   06/12/18 122/76       BP noted to be well controlled today in office.     Jon IS NOT a smoker/uses chewing tobacco.        Past Medical History - No past medical history on file.    Current Medications -   Current Outpatient Prescriptions:      EPINEPHrine (AUVI-Q) 0.3 MG/0.3ML injection 2-pack, Inject 0.3 mLs (0.3 mg) into the muscle as needed for anaphylaxis, Disp: 4 mL, Rfl: 0     ketotifen (ZADITOR/REFRESH ANTI-ITCH) 0.025 % SOLN ophthalmic solution, Place 1 drop into both eyes 2 times daily, Disp: 1 Bottle, Rfl: 3     sildenafil (REVATIO) 20 MG tablet, TAKE 2-5 TABLETS BY MOUTH 30 MINUTES BEFORE INTERCOURSE, Disp: 50 tablet, Rfl: 11     VIAGRA 100 MG tablet, TAKE 0.5-1 TABLET BY MOUTH 30 MINUTES BEFORE INTERCOURSE. NO MORE THEN 1 FULL TABLET PER DAY, Disp: 6 tablet, Rfl: 0    Allergies -   Allergies   Allergen Reactions     Bee Anaphylaxis     Cats Unknown     Itchey eyes, an runny nose       Social History -   Social History     Social History      Marital status:      Spouse name: Steven     Number of children: 1     Years of education: N/A     Occupational History      [employer]     Social History Main Topics     Smoking status: Never Smoker     Smokeless tobacco: Never Used     Alcohol use 0.0 oz/week     0 Standard drinks or equivalent per week      Comment: occ     Drug use: No     Sexual activity: Yes     Partners: Female     Other Topics Concern     Parent/Sibling W/ Cabg, Mi Or Angioplasty Before 65f 55m? No     Social History Narrative       Family History -   Family History   Problem Relation Age of Onset     Depression Father        Review of Systems - As per HPI and PMHx, otherwise review of system review of the head and neck negative.    Physical Exam  There were no vitals taken for this visit.  BMI: There is no height or weight on file to calculate BMI.    General - The patient is well nourished and well developed, and appears to have good nutritional status.  Alert and oriented to person and place, answers questions and cooperates with examination appropriately.    SKIN - No suspicious lesions or rashes.  Respiration - No respiratory distress.     Head and Face - Normocephalic and atraumatic, with no gross asymmetry noted of the contour of the facial features.  The facial nerve is intact, with strong symmetric movements.    Voice and Breathing - The patient was breathing comfortably without the use of accessory muscles. There was no wheezing, stridor, or stertor.  The patients voice was clear and strong, and had appropriate pitch and quality.    Ears - Bilateral pinna and EACs with normal appearing overlying skin. Tympanic membrane intact with good mobility on pneumatic otoscopy bilaterally. Bony landmarks of the ossicular chain are normal. The tympanic membranes are normal in appearance. No retraction, perforation, or masses.  No fluid or purulence was seen in the external canal or the middle ear.     Eyes - Extraocular movements  intact.  Sclera were not icteric or injected, conjunctiva were pink and moist.    Mouth - Examination of the oral cavity showed pink, healthy oral mucosa. No lesions or ulcerations noted.  The tongue was mobile and midline, and the dentition were in good condition.  Patient has class II occlusion with minimal overbite.  Overall dentition is in a state of repair.    Throat - The walls of the oropharynx were smooth, pink, moist, symmetric, and had no lesions or ulcerations.  The tonsillar pillars and soft palate were symmetric.  The uvula was midline on elevation.    Neck - Normal midline excursion of the laryngotracheal complex during swallowing.  Full range of motion on passive movement.  Palpation of the occipital, submental, submandibular, internal jugular chain, and supraclavicular nodes did not demonstrate any abnormal lymph nodes or masses.  The carotid pulse was palpable bilaterally.  Palpation of the thyroid was soft and smooth, with no nodules or goiter appreciated.  The trachea was mobile and midline.    Nose - External contour is symmetric, no gross deflection or scars.  Nasal mucosa is pink and moist with no abnormal mucus.  The septum was midline and non-obstructive, turbinates of larger  size and position.  No polyps, masses, or purulence noted on examination.    Neuro - Nonfocal neuro exam is normal, CN 2 through 12 intact, normal gait and muscle tone.    Performed in clinic today:  No procedures preformed in clinic today          A/P - Jon Gilmore is a 37 year old male with mild obstructive sleep apnea snoring will consider dental appliances option.  He was debrided.  Respiratory his nose and turbinates appear to be the main culprit.  We discussed turbinate reduction via the turbinoplasty of SMR turbinates which she will consider.  This could be done in the office setting.      Chas Aldridge MD      Again, thank you for allowing me to participate in the care of your patient.         Sincerely,        Chas Aldridge MD, MD

## 2018-07-09 NOTE — MR AVS SNAPSHOT
After Visit Summary   7/9/2018    oJn Gilmore    MRN: [id number]           Patient Information     Date Of Birth          1980        Visit Information        Provider Department      7/9/2018 3:00 PM Chas Aldridge MD [facility]         Follow-ups after your visit        Who to contact     If you have questions or need follow up information about today's clinic visit or your schedule please contact [facility] directly at [phone].  Normal or non-critical lab and imaging results will be communicated to you by Me[employer]on[employer]art, letter or phone within 4 business days after the clinic has received the results. If you do not hear from us within 7 days, please contact the clinic through Meusonichart or phone. If you have a critical or abnormal lab result, we will notify you by phone as soon as possible.  Submit refill requests through [employer] or call your pharmacy and they will forward the refill request to us. Please allow 3 business days for your refill to be completed.          Additional Information About Your Visit        MyChart Information     [employer] gives you secure access to your electronic health record. If you see a primary care provider, you can also send messages to your care team and make appointments. If you have questions, please call your primary care clinic.  If you do not have a primary care provider, please call [phone] and they will assist you.        Care EveryWhere ID     This is your Care EveryWhere ID. This could be used by other organizations to access your [place] medical records  [id number]        Your Vitals Were     Pulse Pulse Oximetry BMI (Body Mass Index)             94 99% 31.91 kg/m2          Blood Pressure from Last 3 Encounters:   07/09/18 120/70   06/12/18 122/76   05/22/18 106/70    Weight from Last 3 Encounters:   07/09/18 105.2 kg (232 lb)   06/12/18 103 kg (227 lb)   05/22/18 103.2 kg (227 lb 8 oz)              Today, you  had the following     No orders found for display       Primary Care Provider Office Phone # Fax #    Saji Monzon [phone] [address] [place] [address]        Equal Access to Services     BEN HANNA : Hadii teresa mckeon treo Soomaali, waaxda luqadaha, qaybta kaalmada adekeilayada, patel tylern arabellakeila davis sav mendez. So [facility] [phone].    ATENCIÓN: Si habla español, tiene a candelaria disposición servicios gratuitos de asistencia lingüística. Llame al [phone].    We comply with applicable federal civil rights laws and [place] laws. We do not discriminate on the basis of race, color, national origin, age, disability, sex, sexual orientation, or gender identity.            Thank you!     Thank you for choosing [facility]  for your care. Our goal is always to provide you with excellent care. Hearing back from our patients is one way we can continue to improve our services. Please take a few minutes to complete the written survey that you may receive in the mail after your visit with us. Thank you!             Your Updated Medication List - Protect others around you: Learn how to safely use, store and throw away your medicines at www.disposemymeds.org.          This list is accurate as of 7/9/18  4:31 PM.  Always use your most recent med list.                   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 NO MORE THEN 1 FULL  TABLET PER DAY    Erectile dysfunction, unspecified erectile dysfunction type

## 2018-07-12 ENCOUNTER — TRANSFERRED RECORDS (OUTPATIENT)
Dept: HEALTH INFORMATION MANAGEMENT | Facility: CLINIC | Age: 38
End: 2018-07-12

## 2018-07-20 ENCOUNTER — TRANSFERRED RECORDS (OUTPATIENT)
Dept: HEALTH INFORMATION MANAGEMENT | Facility: CLINIC | Age: 38
End: 2018-07-20

## 2019-02-27 ENCOUNTER — TELEPHONE (OUTPATIENT)
Dept: FAMILY MEDICINE | Facility: CLINIC | Age: 39
End: 2019-02-27

## 2019-02-27 DIAGNOSIS — J06.9 UPPER RESPIRATORY TRACT INFECTION, UNSPECIFIED TYPE: Primary | ICD-10-CM

## 2019-02-27 RX ORDER — AZITHROMYCIN 250 MG/1
TABLET, FILM COATED ORAL
Qty: 6 TABLET | Refills: 0 | Status: SHIPPED | OUTPATIENT
Start: 2019-02-27

## 2019-02-27 NOTE — TELEPHONE ENCOUNTER
URI sx- See rx for zpak. I explained that antibiotics can cause a rash or allergic reaction to develop and so the medication should be stopped if this occurs. Also there is a risk of diarrhea or clostridium difficile pseudomembranous enterocolitis with any antibiotic use so it should be stopped if diarrhea develops and then the clinic should be called so that we have a followup evaluation.  DARCI Monzon MD

## 2019-05-21 DIAGNOSIS — L23.7 POISON OAK DERMATITIS: Primary | ICD-10-CM

## 2019-05-21 RX ORDER — PREDNISONE 20 MG/1
60 TABLET ORAL DAILY
Qty: 21 TABLET | Refills: 0 | Status: SHIPPED | OUTPATIENT
Start: 2019-05-21

## 2019-05-21 NOTE — PROGRESS NOTES
He has poison ivy or poison oak.  Lots of it in his yard.  He broke out in a rash.  He texted me a picture.  I am going to start him on prednisone 60 mg daily until he can come in and see me on Thursday.  DARCI Monzon MD

## 2020-01-02 ENCOUNTER — DOCUMENTATION ONLY (OUTPATIENT)
Dept: LAB | Facility: CLINIC | Age: 40
End: 2020-01-02

## 2020-01-02 DIAGNOSIS — Z00.00 ROUTINE HISTORY AND PHYSICAL EXAMINATION OF ADULT: Primary | ICD-10-CM

## 2020-01-02 NOTE — PROGRESS NOTES
Please review lab orders sign and close encounter.  Lab appt. 01/3/2020 and Physical 01/30/2020.    Raquel ESCOBEDO

## 2020-01-02 NOTE — PROGRESS NOTES
Please review, associate diagnosis and sign pending laboratory future orders for patient's  upcoming lab appointment on 01/03/20.    Thank you,   Carolina Ramirez MLT

## 2020-01-03 DIAGNOSIS — Z00.00 ROUTINE HISTORY AND PHYSICAL EXAMINATION OF ADULT: ICD-10-CM

## 2020-01-03 LAB
ANION GAP SERPL CALCULATED.3IONS-SCNC: 5 MMOL/L (ref 3–14)
BUN SERPL-MCNC: 9 MG/DL (ref 7–30)
CALCIUM SERPL-MCNC: 9.1 MG/DL (ref 8.5–10.1)
CHLORIDE SERPL-SCNC: 104 MMOL/L (ref 94–109)
CHOLEST SERPL-MCNC: 192 MG/DL
CO2 SERPL-SCNC: 29 MMOL/L (ref 20–32)
CREAT SERPL-MCNC: 1.03 MG/DL (ref 0.66–1.25)
GFR SERPL CREATININE-BSD FRML MDRD: >90 ML/MIN/{1.73_M2}
GLUCOSE SERPL-MCNC: 92 MG/DL (ref 70–99)
HDLC SERPL-MCNC: 39 MG/DL
LDLC SERPL CALC-MCNC: 118 MG/DL
NONHDLC SERPL-MCNC: 153 MG/DL
POTASSIUM SERPL-SCNC: 4.1 MMOL/L (ref 3.4–5.3)
SODIUM SERPL-SCNC: 138 MMOL/L (ref 133–144)
TRIGL SERPL-MCNC: 177 MG/DL

## 2020-01-03 PROCEDURE — 80048 BASIC METABOLIC PNL TOTAL CA: CPT | Performed by: PHYSICIAN ASSISTANT

## 2020-01-03 PROCEDURE — 36415 COLL VENOUS BLD VENIPUNCTURE: CPT | Performed by: PHYSICIAN ASSISTANT

## 2020-01-03 PROCEDURE — 80061 LIPID PANEL: CPT | Performed by: PHYSICIAN ASSISTANT

## 2020-01-14 ENCOUNTER — OFFICE VISIT (OUTPATIENT)
Dept: FAMILY MEDICINE | Facility: CLINIC | Age: 40
End: 2020-01-14
Payer: COMMERCIAL

## 2020-01-14 VITALS
WEIGHT: 235 LBS | OXYGEN SATURATION: 98 % | RESPIRATION RATE: 16 BRPM | SYSTOLIC BLOOD PRESSURE: 138 MMHG | DIASTOLIC BLOOD PRESSURE: 80 MMHG | HEART RATE: 92 BPM | HEIGHT: 71 IN | BODY MASS INDEX: 32.9 KG/M2

## 2020-01-14 DIAGNOSIS — Z00.00 ROUTINE GENERAL MEDICAL EXAMINATION AT A HEALTH CARE FACILITY: Primary | ICD-10-CM

## 2020-01-14 DIAGNOSIS — L98.9 SKIN LESION: ICD-10-CM

## 2020-01-14 PROCEDURE — 99395 PREV VISIT EST AGE 18-39: CPT | Performed by: PHYSICIAN ASSISTANT

## 2020-01-14 ASSESSMENT — ENCOUNTER SYMPTOMS: HEADACHES: 1

## 2020-01-14 ASSESSMENT — MIFFLIN-ST. JEOR: SCORE: 1995.14

## 2020-01-14 NOTE — PROGRESS NOTES
"  3  SUBJECTIVE:   CC: Jon Gilmore is an 39 year old male who presents for preventive health visit.     Healthy Habits:    Do you get at least three servings of calcium containing foods daily (dairy, green leafy vegetables, etc.)? { :610853::\"yes\"}    Amount of exercise or daily activities, outside of work: { :353759}    Problems taking medications regularly { :087741::\"No\"}    Medication side effects: { :491964::\"No\"}    Have you had an eye exam in the past two years? { :488243}    Do you see a dentist twice per year? { :181145}    Do you have sleep apnea, excessive snoring or daytime drowsiness?{ :690519}  {Outside tests to abstract? :516245}    {additional problems to add (Optional):058025}    Today's PHQ-2 Score:   PHQ-2 ( 1999 [employer]) 11/8/2016 8/31/2016   Q1: Little interest or pleasure in doing things 0 0   Q2: Feeling down, depressed or hopeless 0 0   PHQ-2 Score 0 0     {PHQ-2 LOOK IN ASSESSMENTS (Optional) :096458}  Abuse: Current or Past(Physical, Sexual or Emotional)- {YES/NO/NA:296351}  Do you feel safe in your environment? {YES/NO/NA:229291}        Social History     Tobacco Use     Smoking status: Never Smoker     Smokeless tobacco: Never Used   Substance Use Topics     Alcohol use: Yes     Alcohol/week: 0.0 standard drinks     Comment: occ     If you drink alcohol do you typically have >3 drinks per day or >7 drinks per week? {ETOH :596244}                      Last PSA:   PSA   Date Value Ref Range Status   01/29/2015 0.60 0 - 4 ug/L Final       Reviewed orders with patient. Reviewed health maintenance and updated orders accordingly - {Yes/No:817083::\"Yes\"}  {Chronicprobdata (Optional):392273}    Reviewed and updated as needed this visit by clinical staff         Reviewed and updated as needed this visit by Provider        {HISTORY OPTIONS (Optional):418246}    ROS:  { :853190::\"CONSTITUTIONAL: NEGATIVE for fever, chills, change in weight\",\"INTEGUMENTARY/SKIN: NEGATIVE for worrisome rashes, " "moles or lesions\",\"EYES: NEGATIVE for vision changes or irritation\",\"ENT: NEGATIVE for ear, mouth and throat problems\",\"RESP: NEGATIVE for significant cough or SOB\",\"CV: NEGATIVE for chest pain, palpitations or peripheral edema\",\"GI: NEGATIVE for nausea, abdominal pain, heartburn, or change in bowel habits\",\" male: negative for dysuria, hematuria, decreased urinary stream, erectile dysfunction, urethral discharge\",\"MUSCULOSKELETAL: NEGATIVE for significant arthralgias or myalgia\",\"NEURO: NEGATIVE for weakness, dizziness or paresthesias\",\"PSYCHIATRIC: NEGATIVE for changes in mood or affect\"}    OBJECTIVE:   There were no vitals taken for this visit.  EXAM:  {Exam Choices:540699}    {Diagnostic Test Results (Optional):396463::\"Diagnostic Test Results:\",\"Labs reviewed in Epic\"}    ASSESSMENT/PLAN:   {Diag Picklist:438075}    COUNSELING:  {MALE COUNSELING MESSAGES:756657::\"Reviewed preventive health counseling, as reflected in patient instructions\"}    Estimated body mass index is 31.91 kg/m  as calculated from the following:    Height as of 5/17/18: 1.816 m (5' 11.5\").    Weight as of 7/9/18: 105.2 kg (232 lb).    {Weight Management Plan (ACO) Complete if BMI is abnormal-  Ages 18-64  BMI >24.9.  Age 65+ with BMI <23 or >30 (Optional):461477}     reports that he has never smoked. He has never used smokeless tobacco.  {Tobacco Cessation -- Complete if patient is a smoker (Optional):953596}    Counseling Resources:  ATP IV Guidelines  Pooled Cohorts Equation Calculator  FRAX Risk Assessment  ICSI Preventive Guidelines  Dietary Guidelines for Americans, 2010  USDA's MyPlate  ASA Prophylaxis  Lung CA Screening    Tan Shabazz PA-C  [facility]  "

## 2020-01-14 NOTE — PROGRESS NOTES
SUBJECTIVE:   CC: Jon Gilmore is an 39 year old male who presents for preventative health visit.     Healthy Habits:     Getting at least 3 servings of Calcium per day:  Yes    Bi-annual eye exam:  Yes    Dental care twice a year:  Yes    Sleep apnea or symptoms of sleep apnea:  Excessive snoring and Sleep apnea    Diet:  Regular (no restrictions)    Frequency of exercise:  1 day/week    Duration of exercise:  30-45 minutes    Taking medications regularly:  Yes    Medication side effects:  Not applicable    PHQ-2 Total Score: 0    Additional concerns today:  No    Spots on lower back he would like looked at - has seen derm in the past. Unclear follow up  Plan. No know changes.     Today's PHQ-2 Score:   PHQ-2 ( 1999 Pfizer) 1/14/2020   Q1: Little interest or pleasure in doing things 0   Q2: Feeling down, depressed or hopeless 0   PHQ-2 Score 0   Q1: Little interest or pleasure in doing things Not at all   Q2: Feeling down, depressed or hopeless Not at all   PHQ-2 Score 0       Abuse: Current or Past(Physical, Sexual or Emotional)- No  Do you feel safe in your environment? Yes        Social History     Tobacco Use     Smoking status: Never Smoker     Smokeless tobacco: Never Used   Substance Use Topics     Alcohol use: Yes     Alcohol/week: 0.0 standard drinks     Comment: occ         Alcohol Use 1/14/2020   Prescreen: >3 drinks/day or >7 drinks/week? No   Prescreen: >3 drinks/day or >7 drinks/week? -       Last PSA:   PSA   Date Value Ref Range Status   01/29/2015 0.60 0 - 4 ug/L Final       Reviewed orders with patient. 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, accidental or unintentional, subsequent encounter     Allergic rhinitis due to dust mite     Allergic rhinitis due to animal dander     Allergic rhinitis due to mold     BMI 33.0-33.9,adult     Past Surgical History:   Procedure Laterality Date     ARTHROSCOPY KNEE RT/LT Right 2002    ACL reconstruction     C INCISION OF PYLORIC MUSCLE      infant:  pyloric stenosis     VASECTOMY       WRIST SURGERY Left        Social History     Tobacco Use     Smoking status: Never Smoker     Smokeless tobacco: Never Used   Substance Use Topics     Alcohol use: Yes     Alcohol/week: 0.0 standard drinks     Comment: occ     Family History   Problem Relation Age of Onset     Depression Father      Prostate Cancer Maternal Grandfather          Current Outpatient Medications   Medication Sig Dispense Refill     azithromycin (ZITHROMAX) 250 MG tablet Two tablets first day, then one tablet daily for four days 6 tablet 0     EPINEPHrine (AUVI-Q) 0.3 MG/0.3ML injection 2-pack Inject 0.3 mLs (0.3 mg) into the muscle as needed for anaphylaxis (Patient not taking: Reported on 1/14/2020) 4 mL 0     predniSONE (DELTASONE) 20 MG tablet Take 60 mg by mouth daily Take all 3 tablets in the morning.. (Patient not taking: Reported on 1/14/2020) 21 tablet 0     sildenafil (REVATIO) 20 MG tablet TAKE 2-5 TABLETS BY MOUTH 30 MINUTES BEFORE INTERCOURSE (Patient not taking: Reported on 1/14/2020) 50 tablet 11     VIAGRA 100 MG tablet TAKE 0.5-1 TABLET BY MOUTH 30 MINUTES BEFORE INTERCOURSE. NO MORE THEN 1 FULL TABLET PER DAY (Patient not taking: Reported on 1/14/2020) 6 tablet 0     Allergies   Allergen Reactions     Bee Anaphylaxis     Cats Unknown     Itchey eyes, an runny nose       Reviewed and updated as needed this visit by clinical  "staff  Tobacco  Allergies  Meds  Problems  Med Hx  Surg Hx  Fam Hx  Soc Hx          Reviewed and updated as needed this visit by Provider  Tobacco  Allergies  Meds  Problems  Med Hx  Surg Hx  Fam Hx          History reviewed. No pertinent past medical history.   Past Surgical History:   Procedure Laterality Date     ARTHROSCOPY KNEE RT/LT Right 2002    ACL reconstruction     C INCISION OF PYLORIC MUSCLE      infant:  pyloric stenosis     VASECTOMY       WRIST SURGERY Left        Review of Systems   Genitourinary: Positive for impotence.   Neurological: Positive for headaches.     OBJECTIVE:   /80   Pulse 92   Resp 16   Ht 1.791 m (5' 10.5\")   Wt 106.6 kg (235 lb)   SpO2 98%   BMI 33.24 kg/m      Physical Exam  GENERAL: healthy, alert and no distress  EYES: Eyes grossly normal to inspection, PERRL and conjunctivae and sclerae normal  HENT: ear canals and TM's normal, nose and mouth without ulcers or lesions  NECK: no adenopathy, no asymmetry, masses, or scars and thyroid normal to palpation  RESP: lungs clear to auscultation - no rales, rhonchi or wheezes  CV: regular rate and rhythm, normal S1 S2, no S3 or S4, no murmur, click or rub, no peripheral edema and peripheral pulses strong  ABDOMEN: soft, nontender, no hepatosplenomegaly, no masses and bowel sounds normal   (male): normal male genitalia without lesions or urethral discharge, no hernia  MS: no gross musculoskeletal defects noted, no edema  SKIN: no suspicious lesions or rashes- multiple nevi on trunk.   NEURO: Normal strength and tone, mentation intact and speech normal  PSYCH: mentation appears normal, affect normal/bright    Diagnostic Test Results:  Labs reviewed in Epic    ASSESSMENT/PLAN:       ICD-10-CM    1. Routine general medical examination at a health care facility Z00.00    2. Skin lesion L98.9 DERMATOLOGY REFERRAL   3. BMI 33.0-33.9,adult Z68.33    1. Work on Healthy diet and exercise. Getting heart rate elevated for " "30 mins most days of week.  2. Follow up  With Derm.       COUNSELING:   Reviewed preventive health counseling, as reflected in patient instructions       Regular exercise       Healthy diet/nutrition       Vision screening    Estimated body mass index is 33.24 kg/m  as calculated from the following:    Height as of this encounter: 1.791 m (5' 10.5\").    Weight as of this encounter: 106.6 kg (235 lb).     Weight management plan: Discussed healthy diet and exercise guidelines     reports that he has never smoked. He has never used smokeless tobacco.      Counseling Resources:  ATP IV Guidelines  Pooled Cohorts Equation Calculator  FRAX Risk Assessment  ICSI Preventive Guidelines  Dietary Guidelines for Americans, 2010  USDA's MyPlate  ASA Prophylaxis  Lung CA Screening      Tan Shabazz PA-C  [facility]  "

## 2020-04-30 ENCOUNTER — TELEPHONE (OUTPATIENT)
Dept: FAMILY MEDICINE | Facility: CLINIC | Age: 40
End: 2020-04-30

## 2020-04-30 DIAGNOSIS — N52.9 ERECTILE DYSFUNCTION, UNSPECIFIED ERECTILE DYSFUNCTION TYPE: ICD-10-CM

## 2020-04-30 RX ORDER — SILDENAFIL CITRATE 20 MG/1
TABLET ORAL
Qty: 50 TABLET | Refills: 11 | Status: SHIPPED | OUTPATIENT
Start: 2020-04-30 | End: 2020-05-05 | Stop reason: ALTCHOICE

## 2020-04-30 NOTE — TELEPHONE ENCOUNTER
Brandon called back, stating he was instructed by Dr. Herrera to call and talk to Ngozi. Please call pt back when available. [phone]

## 2020-04-30 NOTE — TELEPHONE ENCOUNTER
Patient was informed that medication has been refilled as requested.  Last office visit with Dr. Herrera was 4 years ago.  If there are issues, questions, problems with medication, Dr. Herrera recommends a virtual or telephone visit (or office based visit when able) to discuss medication further.    Philippe Solis, [facility]

## 2020-04-30 NOTE — TELEPHONE ENCOUNTER
Medication refilled as requested.  Last office visit with me was 4 years ago.  If there are issues, questions, problems with medication, I would recommend a virtual or telephone visit (or office based visit when able) to discuss medication further.    Will have staff notify patient.     Ced Herrera MD

## 2020-04-30 NOTE — TELEPHONE ENCOUNTER
Prior Authorization Retail Medication Request    Medication/Dose: sildenafil (REVATIO) 20 MG tablet  ICD code (if different than what is on RX):  Erectile dysfunction, unspecified erectile dysfunction type (N52.9)   Previously Tried and Failed:    Rationale:      Insurance Name:  [employer]   Insurance ID:  [id number]      Pharmacy Information (if different than what is on RX)  Name:  [id number] [place], [place] - [address] [facility]   Phone:  [phone]

## 2020-04-30 NOTE — TELEPHONE ENCOUNTER
PA Initiation    Medication: sildenafil (REVATIO) 20 MG tablet  Insurance Company: [employer] - Phone [phone] Fax [phone]  Pharmacy Filling the Rx: [id number] [place] - [address] AT [address] [facility]  Filling Pharmacy Phone: [phone]  Filling Pharmacy Fax: [phone]  Start Date: 4/30/2020

## 2020-04-30 NOTE — TELEPHONE ENCOUNTER
Patient called saying he requested this medication through the pharmacy, but they did not receive the request yet. He stated he spoke to Dr. Herrera on Messenger and was told to get a hold of Ngozi.     Medication is pending signature.     Luci Abrams CMA ([facility]) 4/30/2020

## 2020-05-05 RX ORDER — SILDENAFIL 100 MG/1
TABLET, FILM COATED ORAL
Qty: 10 TABLET | Refills: 11 | Status: SHIPPED | OUTPATIENT
Start: 2020-05-05 | End: 2020-05-05

## 2020-05-05 RX ORDER — SILDENAFIL 100 MG/1
TABLET, FILM COATED ORAL
Qty: 10 TABLET | Refills: 5 | Status: SHIPPED | OUTPATIENT
Start: 2020-05-05

## 2020-05-05 NOTE — TELEPHONE ENCOUNTER
Will try Viagra 100 mg tablets instead as the 20 mg tablets were denied by insurance.  Will have staff notify patient.    Ced Herrera MD

## 2020-05-05 NOTE — TELEPHONE ENCOUNTER
I called this patient with the following per Dr. Herrera: Will try Viagra 100 mg tablets instead as the 20 mg tablets were denied by insurance.  Will have staff notify patient.

## 2020-05-05 NOTE — TELEPHONE ENCOUNTER
PRIOR AUTHORIZATION DENIED    Medication: sildenafil (REVATIO) 20 MG tablet--DENIED    Denial Date: 4/30/2020    Denial Rational: Per call with insurance company plan allows medication for a diagnosis of pulmonary arterial hypertension.     Appeal Information:       After several times of calling, still didn't receive denial fax.

## 2020-07-20 NOTE — TELEPHONE ENCOUNTER
Prior Authorization Retail Medication Request    Medication/Dose: Sildenafil Citrate  ICD code (if different than what is on RX):    Previously Tried and Failed:  0  Rationale:      Insurance Name:  Started on Cover[employer] Meds  KEY: [id number]  Insurance ID:  [id number]      Pharmacy Information (if different than what is on RX)  Name:  [facility]  Phone:  [phone]     5

## 2020-09-27 DIAGNOSIS — S39.012D BACK STRAIN, SUBSEQUENT ENCOUNTER: Primary | ICD-10-CM

## 2020-09-28 RX ORDER — CYCLOBENZAPRINE HCL 10 MG
TABLET ORAL
Qty: 21 TABLET | Refills: 0 | Status: SHIPPED | OUTPATIENT
Start: 2020-09-28

## 2020-09-28 NOTE — TELEPHONE ENCOUNTER
Requested Prescriptions   Pending Prescriptions Disp Refills     cyclobenzaprine (FLEXERIL) 10 MG tablet [Pharmacy Med Name: CYCLOBENZAPRINE 10 MG TABLET] 21 tablet 0     Sig: TAKE 1 TABLET BY MOUTH AT BEDTIME AS NEEDED AS NEEDED FOR MUSCLE SPASMS     Last Written Prescription Date:  N/A  Last Fill Quantity: N/A,   # refills: N/A  Last Office Visit: 06/12/2018  Future Office visit:       Routing refill request to provider for review/approval because:  Drug not on the [facility], P or [facility] refill protocol or controlled substance  Medication is reported/historical    Lu Garcia MA

## 2020-12-14 ENCOUNTER — HEALTH MAINTENANCE LETTER (OUTPATIENT)
Age: 40
End: 2020-12-14

## 2021-02-21 ENCOUNTER — HEALTH MAINTENANCE LETTER (OUTPATIENT)
Age: 41
End: 2021-02-21

## 2021-07-20 DIAGNOSIS — N52.9 ERECTILE DYSFUNCTION, UNSPECIFIED ERECTILE DYSFUNCTION TYPE: ICD-10-CM

## 2021-07-23 RX ORDER — SILDENAFIL CITRATE 20 MG/1
TABLET ORAL
Qty: 50 TABLET | Refills: 11 | OUTPATIENT
Start: 2021-07-23

## 2021-08-27 ENCOUNTER — TELEPHONE (OUTPATIENT)
Dept: FAMILY MEDICINE | Facility: CLINIC | Age: 41
End: 2021-08-27

## 2021-08-27 NOTE — TELEPHONE ENCOUNTER
Patient is requesting diazepam. I don't see it on their med list.      Thank You,  Jaron Rios, Pharmacy [facility]

## 2021-08-27 NOTE — TELEPHONE ENCOUNTER
I called Esperanza in pharmacy to let her know [facility] will NOT fill valium for this pt.  Its not on medlist.  KH

## 2021-10-02 ENCOUNTER — HEALTH MAINTENANCE LETTER (OUTPATIENT)
Age: 41
End: 2021-10-02

## 2022-03-13 ENCOUNTER — HEALTH MAINTENANCE LETTER (OUTPATIENT)
Age: 42
End: 2022-03-13

## 2023-01-14 ENCOUNTER — HEALTH MAINTENANCE LETTER (OUTPATIENT)
Age: 43
End: 2023-01-14

## 2023-03-01 ENCOUNTER — TELEPHONE (OUTPATIENT)
Dept: FAMILY MEDICINE | Facility: CLINIC | Age: 43
End: 2023-03-01

## 2023-03-01 ENCOUNTER — TELEPHONE (OUTPATIENT)
Dept: FAMILY MEDICINE | Facility: CLINIC | Age: 43
End: 2023-03-01
Payer: COMMERCIAL

## 2023-03-01 NOTE — TELEPHONE ENCOUNTER
Reason for Call:  Appointment Request    Patient requesting this type of appt:  Preventive     Requested provider: Saji Monzon    Reason patient unable to be scheduled: Not within requested timeframe    When does patient want to be seen/preferred time: Same day    Comments: preventative care exam for today 3 or later.     Could [facility] you [facility] or would you prefer to receive a phone call?:   Patient would prefer a phone call   Okay to leave a detailed message?: Yes at Cell number on file:    Telephone Information:   Mobile [phone]       Call taken on 3/1/2023 at 8:28 AM by Alba Conner

## 2023-03-01 NOTE — TELEPHONE ENCOUNTER
When patient calls back please let him that Dr. Monzon scheduled his preventative care for May 23rd. At 8:40am check in time.

## 2023-04-23 ENCOUNTER — HEALTH MAINTENANCE LETTER (OUTPATIENT)
Age: 43
End: 2023-04-23

## 2023-05-23 ENCOUNTER — OFFICE VISIT (OUTPATIENT)
Dept: FAMILY MEDICINE | Facility: CLINIC | Age: 43
End: 2023-05-23
Payer: COMMERCIAL

## 2023-05-23 VITALS — WEIGHT: 236.2 LBS | SYSTOLIC BLOOD PRESSURE: 136 MMHG | BODY MASS INDEX: 33.41 KG/M2 | DIASTOLIC BLOOD PRESSURE: 86 MMHG

## 2023-05-23 DIAGNOSIS — Z00.00 WELL ADULT EXAM: Primary | ICD-10-CM

## 2023-05-23 DIAGNOSIS — Z83.719 FAMILY HISTORY OF COLONIC POLYPS: ICD-10-CM

## 2023-05-23 LAB
ANION GAP SERPL CALCULATED.3IONS-SCNC: 8 MMOL/L (ref 7–15)
BUN SERPL-MCNC: 12.1 MG/DL (ref 6–20)
CALCIUM SERPL-MCNC: 9.2 MG/DL (ref 8.6–10)
CHLORIDE SERPL-SCNC: 101 MMOL/L (ref 98–107)
CHOLEST SERPL-MCNC: 189 MG/DL
CREAT SERPL-MCNC: 1.08 MG/DL (ref 0.67–1.17)
DEPRECATED HCO3 PLAS-SCNC: 31 MMOL/L (ref 22–29)
GFR SERPL CREATININE-BSD FRML MDRD: 88 ML/MIN/1.73M2
GLUCOSE SERPL-MCNC: 106 MG/DL (ref 70–99)
HDLC SERPL-MCNC: 46 MG/DL
LDLC SERPL CALC-MCNC: 121 MG/DL
NONHDLC SERPL-MCNC: 143 MG/DL
POTASSIUM SERPL-SCNC: 4 MMOL/L (ref 3.4–5.3)
SODIUM SERPL-SCNC: 140 MMOL/L (ref 136–145)
TRIGL SERPL-MCNC: 110 MG/DL

## 2023-05-23 PROCEDURE — 80048 BASIC METABOLIC PNL TOTAL CA: CPT | Performed by: OBSTETRICS & GYNECOLOGY

## 2023-05-23 PROCEDURE — 36415 COLL VENOUS BLD VENIPUNCTURE: CPT | Performed by: OBSTETRICS & GYNECOLOGY

## 2023-05-23 PROCEDURE — 99386 PREV VISIT NEW AGE 40-64: CPT | Mod: 25 | Performed by: OBSTETRICS & GYNECOLOGY

## 2023-05-23 PROCEDURE — 80061 LIPID PANEL: CPT | Performed by: OBSTETRICS & GYNECOLOGY

## 2023-05-23 PROCEDURE — 90715 TDAP VACCINE 7 YRS/> IM: CPT | Performed by: OBSTETRICS & GYNECOLOGY

## 2023-05-23 PROCEDURE — 90471 IMMUNIZATION ADMIN: CPT | Performed by: OBSTETRICS & GYNECOLOGY

## 2023-05-23 NOTE — PROGRESS NOTES
Subjective:Jon is here for yearly physical. Current concerns are: His father had precancerous colon polyps and so he wants to have an early colonoscopy.    No past medical history on file.   Current Outpatient Medications   Medication Sig Dispense Refill     azithromycin (ZITHROMAX) 250 MG tablet Two tablets first day, then one tablet daily for four days (Patient not taking: Reported on 5/23/2023) 6 tablet 0     cyclobenzaprine (FLEXERIL) 10 MG tablet TAKE 1 TABLET BY MOUTH AT BEDTIME AS NEEDED AS NEEDED FOR MUSCLE SPASMS (Patient not taking: Reported on 5/23/2023) 21 tablet 0     EPINEPHrine (AUVI-Q) 0.3 MG/0.3ML injection 2-pack Inject 0.3 mLs (0.3 mg) into the muscle as needed for anaphylaxis (Patient not taking: Reported on 1/14/2020) 4 mL 0     predniSONE (DELTASONE) 20 MG tablet Take 60 mg by mouth daily Take all 3 tablets in the morning.. (Patient not taking: Reported on 1/14/2020) 21 tablet 0     sildenafil (VIAGRA) 100 MG tablet Take 0.5-1 tablet by mouth 30 minutes before intercourse.  No more than 1 full tab/day. (Patient not taking: Reported on 5/23/2023) 10 tablet 5      Allergies   Allergen Reactions     Bee Anaphylaxis     Cats Unknown     Itchey eyes, an runny nose      History   Smoking Status     Never   Smokeless Tobacco     Never      Past Surgical History:   Procedure Laterality Date     ARTHROSCOPY KNEE RT/LT Right 2002    ACL reconstruction     VASECTOMY       WRIST SURGERY Left      ZZC INCISION OF PYLORIC MUSCLE      infant:  pyloric stenosis      Social History     Tobacco Use     Smoking status: Never     Smokeless tobacco: Never   Substance Use Topics     Alcohol use: Yes     Alcohol/week: 0.0 standard drinks of alcohol     Comment: occ     Drug use: No      Family History   Problem Relation Age of Onset     Depression Father      Prostate Cancer Maternal Grandfather        Review Of Systems  Skin: negative  Eyes: negative  Ears/Nose/Throat: negative  Respiratory: No shortness of  breath, dyspnea on exertion, cough, or hemoptysis  Cardiovascular: negative  Gastrointestinal: negative  Genitourinary: negative  Musculoskeletal: negative  Neurologic: negative  Psychiatric: negative  Hematologic/Lymphatic/Immunologic: negative  Endocrine: negative      Physical Exam: vital signs: Blood pressure 136/86, weight 107.1 kg (236 lb 3.2 oz).        HEENT is normal,   Sclerae and conjunctiva are normal. Ear canals and TMs look normal.  Nasal mucosa is pink and no polyps or masses seen. Throat is unremarkable . Mucous membranes are moist.    Neck is supple, mobile, no adenoapthy or masses palpable. Normal range of motion noted.  Chest is clear to auscultation. No wheezes, rales or rhonchi heard. cardiac exam is normal with s1, s2, no murmurs or adventitious sounds.Normal rate and rhythm is heard.  Abdomen is soft,  nondistended, No masses felt.No HSM. No guarding or rigidity or rebound noted. Nontender   to palpation. Normal bowel sounds heard.   Extremities have normal sensation and color.Normal pulses noted.  No cyanosis or ulcers or trophic skin changes. No edema noted.    Genital Exam: deferred    Assessment/Plan:1.Normal yearly exam  except for family history of colon polyps-I will order a  colonoscopy referral.  2.  Slightly overweight-I suggested walking 5 miles a day and he knows to work on this.  3.  He has some allergy symptoms-we discussed Claritin and Flonase.    Diet and rest and exercise discussed.   See labs and orders.Immunizations reviewed and discussed-including advice for yearly flu shot/tetanus update    Immunizations given today: TDAP    Colonoscopy will be set up    I advised the following exams with specialists:    1. Dental evaluation yearly    2. Dermatology evaluation for mole exam yearly    3. Ophthalmology exam yearly to check for glaucoma, etc        Living will discussed:        Labs done: Lipids and basic panel.  HIV and Hep C offered.   Tests declined.      Followup  in  12 months, sooner if concerns arise.   DARCI Monzon MD(electronic signature)

## 2023-05-23 NOTE — NURSING NOTE
Prior to immunization administration, verified patients identity using patient s name and date of birth. Please see Immunization Activity for additional information.     Screening Questionnaire for Adult Immunization    Are you sick today?   No   Do you have allergies to medications, food, a vaccine component or latex?   No   Have you ever had a serious reaction after receiving a vaccination?   No   Do you have a long-term health problem with heart, lung, kidney, or metabolic disease (e.g., diabetes), asthma, a blood disorder, no spleen, complement component deficiency, a cochlear implant, or a spinal fluid leak?  Are you on long-term aspirin therapy?   No   Do you have cancer, leukemia, HIV/AIDS, or any other immune system problem?   No   Do you have a parent, brother, or sister with an immune system problem?   No   In the past 3 months, have you taken medications that affect  your immune system, such as prednisone, other steroids, or anticancer drugs; drugs for the treatment of rheumatoid arthritis, Crohn s disease, or psoriasis; or have you had radiation treatments?   No   Have you had a seizure, or a brain or other nervous system problem?   No   During the past year, have you received a transfusion of blood or blood    products, or been given immune (gamma) globulin or antiviral drug?   No   For women: Are you pregnant or is there a chance you could become       pregnant during the next month?   No   Have you received any vaccinations in the past 4 weeks?   No     Immunization questionnaire answers were all negative.      Injection of tdap given by Nita Spencer CMA. Patient instructed to remain in clinic for 15 minutes afterwards, and to report any adverse reactions.     Screening performed by Nita Spencer CMA on 5/23/2023 at 9:35 AM.

## 2023-06-01 ENCOUNTER — HOSPITAL ENCOUNTER (OUTPATIENT)
Facility: CLINIC | Age: 43
End: 2023-06-01
Attending: INTERNAL MEDICINE | Admitting: INTERNAL MEDICINE
Payer: COMMERCIAL

## 2023-06-01 ENCOUNTER — TELEPHONE (OUTPATIENT)
Dept: GASTROENTEROLOGY | Facility: CLINIC | Age: 43
End: 2023-06-01
Payer: COMMERCIAL

## 2023-06-01 NOTE — TELEPHONE ENCOUNTER
Screening Questions  BLUE  KIND OF PREP RED  LOCATION [review exclusion criteria] GREEN  SEDATION TYPE        n Are you active on mychart?       Saji Monzon MD in  FAMILY PRACTICE Ordering/Referring Provider?        [facility] What type of coverage do you have?      n Have you had a positive covid test in the last 14 days?     33.0 1. BMI  [BMI 40+ - review exclusion criteria& smart-phrase document]    y  2. Are you able to give consent for your medical care? [IF NO,RN REVIEW]          n  3. Are you taking any prescription pain medications on a routine schedule   (ex narcotics: oxycodone, roxicodone, oxycontin,  and percocet)? [RN Review]        n  3a. EXTENDED PREP What kind of prescription?     n 4. Do you have any chemical dependencies such as alcohol, street drugs, or methadone?        **If yes 3- 5 , please schedule with MAC sedation.**          IF YES TO ANY 6 - 10 - HOSPITAL SETTING ONLY.     n 6.   Do you need assistance transferring?     n 7.   Have you had a heart or lung transplant?    n 8.   Are you currently on dialysis?   n 9.   Do you use daily home oxygen?   n 10. Do you take nitroglycerin?   10a. n If yes, how often?     n 11. Are you currently pregnant?    11a. n If yes, how many weeks? [ Greater than 12 weeks, OR NEEDED]    n 12. Do you have Pulmonary Hypertension? *NEED PAC APPT AT [facility] w/ [facility]*     n 13. [review exclusion criteria]  Do you have any implantable devices in your body (pacemaker, defib, LVAD)?    n 14. In the past 6 months, have you had any heart related issues including cardiomyopathy or heart attack?     14a. n If yes, did it require cardiac stenting if so when?     n 15. Have you had a stroke or Transient ischemic attack (TIA - aka  mini stroke ) within 6 months?      y 16. Do you have mod to severe Obstructive Sleep Apnea?  [Hospital only]    n 17. Do you have SEVERE AND UNCONTROLLED asthma? *NEED PAC APPT AT [facility] w/[facility]*     18.Do you take blood thinners?  No    n  "19. Do you take any of the following medications?    nPhentermine    nOzempic    nWegovy (Semaglutide)      19a. If yes, \"Hold for 7 days before procedure.  Please consult your prescribing provider if you have questions about holding this medication.\"     n  20. Do you have chronic kidney disease?      n  21. Do you have a diagnosis of diabetes?     n  22. On a regular basis do you go 3-5 days between bowel movements?      23. Preferred LOCAL Pharmacy for Pre Prescription        [id number] - [place], [place] - [address] [place] [address] AT [address] [facility]        - CLOSING REMINDERS -    You will receive a call from a Nurse to review instructions and health history.  This assessment must be completed prior to your procedure.  Failure to complete the Nurse assessment may result in the procedure being cancelled.      On the day of your procedure, please designatean adult(s) who can drive you home stay with you for the next 24 hours. The medicines used in the exam will make you sleepy. You will not be able to drive.      You cannot take public transportation, ride share services, or non-medical taxi service without a responsible caregiver.  Medical transport services are allowed with the requirement that a responsible caregiver will receive you at your destination.  We require that drivers and caregivers are confirmed prior to your procedure.      - SCHEDULING DETAILS -  y & MYRTLE Hospital Setting Required & If yes, what is the exclusion?   Angus Salomon    07/19/2023  Date of Procedure  Lower Endoscopy [Colonoscopy]  Type of Procedure Scheduled  [facility]   MIRALAX GATORADE WITHOUT MAGNEISUM CITRATE Which Colonoscopy Prep was Sent?     mac Sedation Type     n PAC / Pre-op Required                 "

## 2023-07-17 ENCOUNTER — TELEPHONE (OUTPATIENT)
Dept: GASTROENTEROLOGY | Facility: CLINIC | Age: 43
End: 2023-07-17
Payer: COMMERCIAL

## 2023-07-17 NOTE — TELEPHONE ENCOUNTER
Caller: Writer to patient  Reason for Reschedule/Cancellation (please be detailed, any staff messages or encounters to note?): Patient request  Elizabeth Massey RN  P Endoscopy Scheduling Pool  Pt requesting cancel / rescheduling colonoscopy on Wed 7/19.  Thx.     Prior to reschedule please review:    Ordering Provider: Saji Monzon MD    Sedation per order: Moderate    Does patient have any ASC Exclusions, please identify?: Y - MYRTLE      Notes on Cancelled Procedure:    Procedure: Lower Endoscopy [Colonoscopy]     Date: 7/19/2023    Location: [facility]; [address] [place] [address] , [place] [address]    Surgeon: Long      Rescheduled: No , VM box full, sent MyChart.

## 2024-02-07 ENCOUNTER — TRANSFERRED RECORDS (OUTPATIENT)
Dept: HEALTH INFORMATION MANAGEMENT | Facility: CLINIC | Age: 44
End: 2024-02-07

## 2024-02-12 ENCOUNTER — TELEPHONE (OUTPATIENT)
Dept: SURGERY | Facility: OTHER | Age: 44
End: 2024-02-12

## 2024-02-12 NOTE — CONFIDENTIAL NOTE
February 12, 2024    Referral received from [facility]. Left message for patient to return call to schedule appointment with General Surgery Dept.    -Lu Bennett on 2/12/2024 at 2:40 PM

## 2024-02-27 ENCOUNTER — DOCUMENTATION ONLY (OUTPATIENT)
Dept: SLEEP MEDICINE | Facility: HOSPITAL | Age: 44
End: 2024-02-27

## 2024-02-28 NOTE — PROGRESS NOTES
STOP JAYCEE       Name: Jon Gilmore MRN# [id number]   Age: 43 year old YOB: 1980     Stop Bang questionnaire completed with a score of >3 to allow for HST     Have you been told you snore loudly (louder than talking or loud enough to be heard through doors)? YES    Do you often feel tired, fatigued, or sleepy during the daytime? YES    Has anyone observed you stop breathing during your sleep? YES    Do you have or are you being treated for high blood pressure? YES    Is your BMI greater than 35? NO    Is your neck size circumference 16 inches or greater? NO    Are you over 50 years old? NO    Stop Bang Score (# of yes): 5

## 2024-02-28 NOTE — PROGRESS NOTES
SLEEP HISTORY QUESTIONNAIRE    Please describe the main reason for your sleep appointment? I am catching myself gasping for air, partner says I am snoring loudly. I wake up tired and sometimes with a headache that will not go away. I am not getting good sleep.    How long has this been a problem? Last couple years    Have you been diagnosed with a sleep problem in the past? YES    If so, what? Mild sleep apnea    What treatment was recommended? Mouth guard    Have you had a sleep study in the past? YES    If yes, where and when? [facility], 6/13/2018    Sleep Habits:   Do you read in bed? No  Do you eat in bed? No  Do you watch TV in bed? Yes  Do you work in bed? Yes  Do you use a phone or computer in bed? Yes    Is you sleep disturbed by:   Bed partner: Yes  Children: No  Noise: No   Pets: No  Other:       On two or more nights per week, do you drink alcohol to help you fall asleep?NO    On two or more nights per week, do you take melatonin to help you fall asleep? NO    On two or more nights per week, do you take over the counter medicine to fall asleep?  NO    Do you take drinks with caffeine (coffee, tea, soda, energy drinks)? NO    Do you have 3 or more caffeine drinks in a day? NO    Do you have caffeine drinks within 6 hours of bedtime? NO    Do you smoke or use tobacco? NO    Do you exercise? NO    Sleep Routine:   Using a 24 Hour Clock    What time do you usually get into bed on workdays? 10pm    Weekend/non work days? 9pm    What time do you get out of bed on workdays? 6am      Weekend/non work days?8am    Do you work the evening or night shift or do your shifts rotate? NO    How long does it usually take to fall to sleep? 5 min    How many times do you wake during the night? 5    How much time do you feel that you are awake during the entire night? 30 min    How long does it take for you to fall back to sleep after you wake up? 5 min    Why do you think you wake up? Partner saying snoring,  bathroom    What do you do when you wake up? bathroom    How much sleep do you think you get on work nights? 5-8    How much sleep do you think you get on weekends/non work days? 8    How much sleep do you think you need to feel your best? 8-10    How many days during a week do you take a nap on average? 0    What is the average length of your naps? 0    Do you feel better after taking a nap? YES    If you could chose the best sleep schedule for you, what time would you go to bed? 8pm  What time would you get up? 6am    Do you read in bed? NO    Do you eat in bed? NO    Do you watch TV in bed? YES    Do you do work in bed? YES    Do you use a computer or phone in bed? YES    Sleep Disruptions?   Leg movements:  Do you ever have restless, crawling, aching or other unusual feelings in your legs? YES    Do you ever wake yourself by kicking your legs during the night? NO    Are the sheets and blankets messed up or tossed about when you get up? YES    Night-time behaviors:   Do you have nightmares or night terrors? NO   How often?     Have you had times when you were sleep walking? NO    Have you been seen doing anything unusual while you sleep at nights? NO  What?   How often?     Have you ever hurt yourself or someone else while you were sleeping? NO  Please describe:     Do you clench or grind your teeth during the night? yes    Sleep Apnea (pauses in breathing during sleep):  Do you wake with a headache in the morning? YES  How often? 2/week    Does your bed partner, family or friends ever say that you snore? YES  How many nights per week do you snore? frequent  Can snoring be heard outside the bedroom? yes    Do you ever wake yourself up from snoring, gasping or choking? YES    Have you ever been told that you stop breathing or have pauses in your breathing? YES    Do you wake in the morning with a dry throat or mouth? NO    Do you have trouble breathing through your nose? YES    Do you have problems with heartburn,  reflux or a hiatal hernia? NO    Which positions do you usually sleep in? (stomach, back, sides, all) all    Do you use oxygen or any other medical equipment when you sleep? NO    Do members of your family (related by blood) snore? YES    Have any members of your family been diagnosed with with sleep apnea? NO    Do other members of your family have restless leg? NO    Do other members of your family have sleep walking? NO    Have you ever had an accident, or near accident due to sleepiness while driving? NO    Does your sleepiness affect your work on the job or at school? YES    Do you ever fall asleep by accident while doing a task? NO    Have you had sudden muscle weakness when laughing, angry or surprised? NO    Have you ever been unable to move your body when falling asleep or waking up? NO    Do you ever have trouble  your dreams from real life events? NO  Please describe:     Physical Health: (including illness and injury): During the past 30 days, on how many days was your physical health not good? 4/30 days     Mental Health: (including stress, depression, and problems with emotions): During the last 30 days, how may days was your mental health not good? 0/30 days.     During the past 30 days, on how many days did poor physical or mental health keep you from doing your usual activities? This might be self-care, work, or play? 0/30 days.     Social History:   Marital status:     Who lives in your home with you? Wife and two boys    Mother (alive or dead)? alive If has , from what?   Father (alive or dead)? alive If has , from what?     Siblings: YES  Have any ? NO  If so, from what?     Currently working? YES  If yes, work: teacher and high school football/  Former jobs:      Sleepiness Scale:   Sitting and reading 3   Watching TV 2   Sitting in a public place 2   Riding in a car 0   Lying down to rest in the afternoon 2   Sitting and talking to someone 0   Sitting  quietly after a lunch without alcohol 0   In a car, stopping for a few minutes in traffic 0       Surgical History:   Past Surgical History:   Procedure Laterality Date    ARTHROSCOPY KNEE RT/LT Right 2002    ACL reconstruction    VASECTOMY      WRIST SURGERY Left     ZZC INCISION OF PYLORIC MUSCLE      infant:  pyloric stenosis       Medical Conditions: No past medical history on file.    Medications:   Current Outpatient Medications   Medication Sig    azithromycin (ZITHROMAX) 250 MG tablet Two tablets first day, then one tablet daily for four days (Patient not taking: Reported on 5/23/2023)    cyclobenzaprine (FLEXERIL) 10 MG tablet TAKE 1 TABLET BY MOUTH AT BEDTIME AS NEEDED AS NEEDED FOR MUSCLE SPASMS (Patient not taking: Reported on 5/23/2023)    EPINEPHrine (AUVI-Q) 0.3 MG/0.3ML injection 2-pack Inject 0.3 mLs (0.3 mg) into the muscle as needed for anaphylaxis (Patient not taking: Reported on 1/14/2020)    predniSONE (DELTASONE) 20 MG tablet Take 60 mg by mouth daily Take all 3 tablets in the morning.. (Patient not taking: Reported on 1/14/2020)    sildenafil (VIAGRA) 100 MG tablet Take 0.5-1 tablet by mouth 30 minutes before intercourse.  No more than 1 full tab/day. (Patient not taking: Reported on 5/23/2023)     No current facility-administered medications for this visit.       Are you currently having any of the following symptoms?   General:   Obvious weight gain or loss NO  Fever, chills or sweats NO  Drug allergies: no    Eyes:   Changes in vision NO  Blind spots NO  Double vision NO  Other     Ear, Nose and Throat:   Ear pain NO  Sore throat NO  Sinus pain NO  Post-nasal drip NO  Runny nose NO  Bloody nose NO    Heart:   Rapid or irregular heart beat NO  Chest pain or pressure NO  Out of breath when lying down NO  Swelling in feet or legs NO  High blood pressure YES  Heart disease NO    Nervous system   Headaches YES  Weakness in arms or legs NO  Numbness in arms of legs NO  Other:     Skin  Rashes  NO  New moles or skin changes NO  Other     Lungs  Shortness of breath at rest NO  Shortness of breath with activity NO  Dry cough NO  Coughing up mucous or phlegm NO  Coughing up blood NO  Wheezing when breathing NO    Lymph System  Swollen lymph nodes YES  New lumps or bumps NO  Changes in breasts or discharge NO    Digestive System   Nausea or vomiting NO  Loose or watery stools NO  Hard, dry stools (constipation) NO  Fat or grease in stools NO  Blood in stools NO  Stools are black or bloody NO  Abdominal (belly) pain NO    Urinary Tract   Pain when you urinate (pee) NO  Blood in your urine NO  Urinate (pee) more than normal NO  Irregular periods NO    Muscles and bones   Muscle pain NO  Joint or bone pain YES  Swollen joints NO  Other     Glands  Increased thirst or urination NO  Diabetes NO  Morning glucose: no  Afternoon glucose: no    Mental Health  Depression NO  Anxiety NO  Other mental health issues: no

## 2024-03-02 NOTE — PROGRESS NOTES
"Chart review prior to sleep testing.    Patient Summary:  43 year old yo male who is referred for sleep-disordered breathing.    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, accidental or unintentional, subsequent encounter 2018     Priority: Medium    Allergic rhinitis due to dust mite 2018     Priority: Medium    Allergic rhinitis due to animal dander 2018     Priority: Medium    Allergic rhinitis due to mold 2018     Priority: Medium    Old complete tear of anterior cruciate ligament of right knee 2016     Priority: Medium    Chondromalacia of left knee 2016     Priority: Medium    CARDIOVASCULAR SCREENING; LDL GOAL LESS THAN 160 10/31/2010     Priority: Medium       Current Outpatient Medications   Medication    azithromycin (ZITHROMAX) 250 MG tablet    cyclobenzaprine (FLEXERIL) 10 MG tablet    EPINEPHrine (AUVI-Q) 0.3 MG/0.3ML injection 2-pack    predniSONE (DELTASONE) 20 MG tablet    sildenafil (VIAGRA) 100 MG tablet     No current facility-administered medications for this visit.     Pertinent PMHx of chronic allergic rhinitis.    Prior Sleep Testin2018 - PSG with weight 229 lbs, BMI 31.7.  AHI 5.8.  SpO2 <= 88% for 0.6 minutes.  PLM index 3.1.    STOP-BANG score of 5, with unknown neck circumference.  [place] score of 9.  BMI of Estimated body mass index is 33.41 kg/m  as calculated from the following:    Height as of 20: 1.791 m (5' 10.5\").    Weight as of 23: 107.1 kg (236 lb 3.2 oz).     Per questionnaire: \"I am catching myself gasping for air, partner says I am snoring loudly. I wake up tired and sometimes with a headache that will not go away. I am not getting good sleep. \"    Sxs for few years.  Recommended for oral appliance following prior PSG.    Caffeine " use:  No for 3+ per day.  No for within 6 hours of bed.    Tobacco use: No    Sleep pattern:  Workdays.  10pm - 6am, total sleep time 5-8 hours.  Weekends.  9pm - 8am, total sleep time 8 hours.  Time to fall asleep: ~5 minutes.  Awakenings: 5 times per night, 5 minutes to return to sleep, awake for total of 0.5 hours per night.  Napping.  0 days per week, - hours per nap.    Yes for RLS screen.  No for sleep walking.  No for dream enactment behavior.  Yes for bruxism.    Yes for morning headaches.  Yes for snoring.  Yes for observed apnea.  No for FHx of MYRTLE.    SHx:  , lives with wife and two sons.  Works as teacher and HS football / .    A/P:    1.)  High likelihood of MYRTLE with STOP-BANG score of 5.  2.)  History of mild MYRTLE on PSG in 2018  3.)  Interim weight gain of ~10 lbs    Potential for worsening of MYRTLE severity with interim weight gain, though  I feel it would be unlikely to be in severe range.    Recommend clinic visit to discuss treatment options, including consideration for CPAP and experience with mouth guard device.    ---  This note was written with the assistance of the Dragon voice-dictation technology software. The final document, although reviewed, may contain errors. For corrections, please contact the office.    Macho Zelaya MD    Sleep Medicine  [place], [place]  Main Office: [phone]  [facility] [address], [address]  Schedule visits: [phone]  Main Office: [phone]  Fax: [phone]

## 2024-03-05 ENCOUNTER — TELEPHONE (OUTPATIENT)
Dept: PULMONOLOGY | Facility: OTHER | Age: 44
End: 2024-03-05

## 2024-04-15 NOTE — PROGRESS NOTES
"Virtual Visit Details    Type of service:  Video Visit     Originating Location (pt. Location): Home    Distant Location (provider location):  Off-site  Platform used for Video Visit: Taj    Jon Gilmore is a 43 year old male who is being evaluated via a billable video visit.       The patient has been notified of following:      \"This video visit will be conducted via a call between you and your physician/provider. We have found that certain health care needs can be provided without the need for an in-person physical exam.  This service lets us provide the care you need with a video conversation.  If a prescription is necessary we can send it directly to your pharmacy.  If lab work is needed we can place an order for that and you can then stop by our lab to have the test done at a later time.     Video visits are billed at different rates depending on your insurance coverage.  Please reach out to your insurance provider with any questions.     If during the course of the call the physician/provider feels a video visit is not appropriate, you will not be charged for this service.\"     Patient has given verbal consent for Video visit? Yes  How would you like to obtain your AVS? Mail a copy  If you are dropped from the video visit, the video invite should be resent to: Text to cell phone: -  Will anyone else be joining your video visit? No  If patient encounters technical issues they should call [phone]      Video-Visit Details     Type of service:  Video Visit     Start Time:  2:30pm  End Time:  2:50pm    Originating Location (pt. Location): Home     Distant Location (provider location):  Off-site, [facility] [place]       Platform used for Video Visit: Taj    Virtual visit for history of MYRTLE.     A/P:     1.)  High likelihood of MYRTLE with STOP-BANG score of 5.  2.)  History of mild MYRTLE on PSG in 2018  3.)  Interim weight gain of ~10 lbs     Potential for worsening of MYRTLE severity " "with interim weight gain, though  I feel it would be unlikely to be in severe range.     Definite clinical benefit from use of self-fit MAD, but he is interested in seeing the current MYRTLE severity, so we will plan to proceed with diagnostic WatchPAT HST without use of oral appliance.    SUBJECTIVE:  Jon Gilmore is a 43 year old male.    43 year old yo male who is referred for sleep-disordered breathing.     Pertinent PMHx of chronic allergic rhinitis.     Prior Sleep Testin2018 - PSG with weight 229 lbs, BMI 31.7.  AHI 5.8.  SpO2 <= 88% for 0.6 minutes.  PLM index 3.1.     STOP-BANG score of 5, with unknown neck circumference.  Belle score of 9.  BMI of Estimated body mass index is 33.41 kg/m  as calculated from the following:    Height as of 20: 1.791 m (5' 10.5\").    Weight as of 23: 107.1 kg (236 lb 3.2 oz).      Today - We reviewed his sleep history in more detail.  He notes he was prescribed a mandibular advancement device (MAD) after his 2018 PSG and he did have this made by dentistry, but it did not fit well and has only been using the upper half for teeth grinding.    He notes over the past two years an increase in AM headaches, gasping awakenings, snoring.    Given his poor sleep, he did obtain a self-fit MAD (Snore Rx) and has noted significant benefit of less tired, decreased headaches.  He is concerned the SnoreRx guard will not last long secondary to his bruxism.    ---    Per questionnaire: \"I am catching myself gasping for air, partner says I am snoring loudly. I wake up tired and sometimes with a headache that will not go away. I am not getting good sleep. \"     Sxs for few years.  Recommended for oral appliance following prior PSG.     Caffeine use:  No for 3+ per day.  No for within 6 hours of bed.     Tobacco use: No     Sleep pattern:  Workdays.  10pm - 6am, total sleep time 5-8 hours.  Weekends.  9pm - 8am, total sleep time 8 hours.  Time to fall asleep: ~5 " minutes.  Awakenings: 5 times per night, 5 minutes to return to sleep, awake for total of 0.5 hours per night.  Napping.  0 days per week, - hours per nap.     Yes for RLS screen.  No for sleep walking.  No for dream enactment behavior.  Yes for bruxism.     Yes for morning headaches.  Yes for snoring.  Yes for observed apnea.  No for FHx of MYRTLE.     SHx:  , lives with wife and two sons.  Works as teacher and HS football / .      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, accidental or unintentional, subsequent encounter 05/22/2018     Priority: Medium    Allergic rhinitis due to dust mite 05/22/2018     Priority: Medium    Allergic rhinitis due to animal dander 05/22/2018     Priority: Medium    Allergic rhinitis due to mold 05/22/2018     Priority: Medium    Old complete tear of anterior cruciate ligament of right knee 08/11/2016     Priority: Medium    Chondromalacia of left knee 08/11/2016     Priority: Medium    CARDIOVASCULAR SCREENING; LDL GOAL LESS THAN 160 10/31/2010     Priority: Medium       10 point ROS of systems including Constitutional, Eyes, Respiratory, Cardiovascular, Gastroenterology, Genitourinary, Integumentary, Muscularskeletal, Psychiatric were all negative except for pertinent positives noted in my HPI.    Current Outpatient Medications   Medication Sig Dispense Refill    azithromycin (ZITHROMAX) 250 MG tablet Two tablets first day, then one tablet daily for four days (Patient not taking: Reported on 5/23/2023) 6 tablet 0    cyclobenzaprine (FLEXERIL) 10 MG tablet TAKE 1 TABLET BY MOUTH AT BEDTIME AS NEEDED AS NEEDED FOR MUSCLE SPASMS (Patient not taking: Reported on 5/23/2023) 21 tablet 0    EPINEPHrine (AUVI-Q) 0.3 MG/0.3ML injection 2-pack Inject 0.3 mLs (0.3  mg) into the muscle as needed for anaphylaxis (Patient not taking: Reported on 1/14/2020) 4 mL 0    predniSONE (DELTASONE) 20 MG tablet Take 60 mg by mouth daily Take all 3 tablets in the morning.. (Patient not taking: Reported on 1/14/2020) 21 tablet 0    sildenafil (VIAGRA) 100 MG tablet Take 0.5-1 tablet by mouth 30 minutes before intercourse.  No more than 1 full tab/day. (Patient not taking: Reported on 5/23/2023) 10 tablet 5       OBJECTIVE:  There were no vitals taken for this visit.    Physical Exam     ---  This note was written with the assistance of the Dragon voice-dictation technology software. The final document, although reviewed, may contain errors. For corrections, please contact the office.    Total time spent preparing to see the patient, review of chart, obtaining history and physical examination, review of sleep testing, review of treatment options, education, discussion with patient and documenting in Epic / EMR was 30 minutes.  All time involved was spent on the day of service for the patient (the same day as the patient's appointment).    Macho Zelaya MD    Sleep Medicine  [place], [place]  Main Office: [phone]  [facility] [place], [place]  [address] [facility], [address]  Schedule visits: [phone]  Main Office: [phone]  Fax: [phone]

## 2024-04-16 ENCOUNTER — VIRTUAL VISIT (OUTPATIENT)
Dept: PULMONOLOGY | Facility: OTHER | Age: 44
End: 2024-04-16
Attending: FAMILY MEDICINE
Payer: COMMERCIAL

## 2024-04-16 VITALS — BODY MASS INDEX: 32.2 KG/M2 | HEIGHT: 71 IN | WEIGHT: 230 LBS

## 2024-04-16 DIAGNOSIS — G89.29 CHRONIC NONINTRACTABLE HEADACHE, UNSPECIFIED HEADACHE TYPE: ICD-10-CM

## 2024-04-16 DIAGNOSIS — R53.82 CHRONIC FATIGUE: ICD-10-CM

## 2024-04-16 DIAGNOSIS — R51.9 CHRONIC NONINTRACTABLE HEADACHE, UNSPECIFIED HEADACHE TYPE: ICD-10-CM

## 2024-04-16 DIAGNOSIS — G47.33 OSA (OBSTRUCTIVE SLEEP APNEA): Primary | ICD-10-CM

## 2024-04-16 PROCEDURE — 99203 OFFICE O/P NEW LOW 30 MIN: CPT | Mod: 95 | Performed by: FAMILY MEDICINE

## 2024-04-16 ASSESSMENT — PAIN SCALES - GENERAL: PAINLEVEL: NO PAIN (0)

## 2024-04-16 NOTE — NURSING NOTE
Is the patient currently in the state of [place]? YES    Visit mode:VIDEO    If the visit is dropped, the patient can be reconnected by: VIDEO VISIT: Send to e-mail at: [email or web address]    Will anyone else be joining the visit? NO  (If patient encounters technical issues they should call [phone]    How would you like to obtain your AVS? MyChart    Are changes needed to the allergy or medication list? No    Are refills needed on medications prescribed by this physician? NO    Reason for visit: Consult    Thelma KHANNA    Has patient had flu shot for current/most recent flu season? If so, when? Yes: 2/2024

## 2024-05-29 NOTE — PROGRESS NOTES
Patient was provided both verbal and written education and instructions on use of Watch PAT device. Watch PAT device has been registered and shipped via [employer] on 5/29/2024. Patient was notified that package was mailed out. Watch PAT serial number: [id number]    Tracking #[id number].

## 2024-05-30 ENCOUNTER — VIRTUAL VISIT (OUTPATIENT)
Dept: SLEEP MEDICINE | Facility: HOSPITAL | Age: 44
End: 2024-05-30
Attending: FAMILY MEDICINE
Payer: COMMERCIAL

## 2024-05-30 DIAGNOSIS — G47.33 OSA (OBSTRUCTIVE SLEEP APNEA): Primary | ICD-10-CM

## 2024-06-30 ENCOUNTER — HEALTH MAINTENANCE LETTER (OUTPATIENT)
Age: 44
End: 2024-06-30

## 2024-07-01 ENCOUNTER — VIRTUAL VISIT (OUTPATIENT)
Dept: SLEEP MEDICINE | Facility: HOSPITAL | Age: 44
End: 2024-07-01
Attending: FAMILY MEDICINE
Payer: COMMERCIAL

## 2024-07-01 DIAGNOSIS — G47.33 OBSTRUCTIVE SLEEP APNEA (ADULT) (PEDIATRIC): Primary | ICD-10-CM

## 2024-07-01 PROCEDURE — 95800 SLP STDY UNATTENDED: CPT

## 2024-07-01 PROCEDURE — 95800 SLP STDY UNATTENDED: CPT | Mod: 26

## 2024-07-01 NOTE — PROGRESS NOTES
"Virtual Visit Details    Type of service:  Video Visit     Originating Location (pt. Location): Home    Distant Location (provider location):  Off-site  Platform used for Video Visit: Taj      Jon Gilmore is a 43 year old male who is being evaluated via a billable video visit.       The patient has been notified of following:      \"This video visit will be conducted via a call between you and your physician/provider. We have found that certain health care needs can be provided without the need for an in-person physical exam.  This service lets us provide the care you need with a video conversation.  If a prescription is necessary we can send it directly to your pharmacy.  If lab work is needed we can place an order for that and you can then stop by our lab to have the test done at a later time.     Video visits are billed at different rates depending on your insurance coverage.  Please reach out to your insurance provider with any questions.     If during the course of the call the physician/provider feels a video visit is not appropriate, you will not be charged for this service.\"     Patient has given verbal consent for Video visit? Yes  How would you like to obtain your AVS? Mail a copy  If you are dropped from the video visit, the video invite should be resent to: Text to cell phone: -  Will anyone else be joining your video visit? No  If patient encounters technical issues they should call [phone]      Video-Visit Details     Type of service:  Video Visit     Start Time:  1pm  End Time:  1:24pm    Originating Location (pt. Location): Home     Distant Location (provider location):  Off-site, [facility] [place]       Platform used for Video Visit: Taj    Virtual visit for review of home sleep testing results.     A/P:     1.)  High likelihood of MYRTLE with STOP-BANG score of 5.  2.)  History of mild MYRTLE on PSG in 2018  3.)  Interim weight gain of ~10 lbs     Potential for " worsening of MYRTLE severity with interim weight gain, though  I feel it would be unlikely to be in severe range.     Given his significant bruxism, we did agree to proceed with referral to sleep dentistry for a hopeful formal mandibular advancement device.    I did call his primary dentist and they do provide this service.  They requested that we fax over a copy of his sleep test results.  They then recommended that the patient call to schedule a visit for the sleep apnea mouthguard.    Vielka  [facility] [address] [address]  [email or web address]  Phone:  [phone]  Fax: [phone]    4.)  Significant bruxism with refractory symptoms with use of a teeth protective device.    There is limited data regarding pharmacotherapy for bruxism, though small studies have involved clonazepam or clonidine.  Nonmedication options that have included Botox injections to the temporalis and masseter muscles also had small studies.    The longitudinal plan of care for the diagnosis(es)/condition(s) as documented were addressed during this visit. Due to the added complexity in care, I will continue to support Jon in the subsequent management and with ongoing continuity of care.      SUBJECTIVE:  Jon Gilmore is a 43 year old male.    Pertinent PMHx of chronic allergic rhinitis.     Prior Sleep Testin2018 - PSG with weight 229 lbs, BMI 31.7.  AHI 5.8.  SpO2 <= 88% for 0.6 minutes.  PLM index 3.1.  2024 -WatchPAT HST with weight 236 lbs.  pAHI 10.5.  Mean SpO2 95%, niall 85%.  Time less than or equal to 88% of 1.4 minutes.    2024 - We reviewed his sleep history in more detail.  He notes he was prescribed a mandibular advancement device (MAD) after his 2018 PSG and he did have this made by dentistry, but it did not fit well and has only been using the upper half for teeth grinding.     He notes over the past two years an increase in AM headaches, gasping awakenings,  "snoring.     Given his poor sleep, he did obtain a self-fit MAD (Snore Rx) and has noted significant benefit of less tired, decreased headaches.  He is concerned the SnoreRx guard will not last long secondary to his bruxism.     ---     Per questionnaire: \"I am catching myself gasping for air, partner says I am snoring loudly. I wake up tired and sometimes with a headache that will not go away. I am not getting good sleep. \"     Sxs for few years.  Recommended for oral appliance following prior PSG.     Caffeine use:  No for 3+ per day.  No for within 6 hours of bed.     Tobacco use: No     Sleep pattern:  Workdays.  10pm - 6am, total sleep time 5-8 hours.  Weekends.  9pm - 8am, total sleep time 8 hours.  Time to fall asleep: ~5 minutes.  Awakenings: 5 times per night, 5 minutes to return to sleep, awake for total of 0.5 hours per night.  Napping.  0 days per week, - hours per nap.     Yes for RLS screen.  No for sleep walking.  No for dream enactment behavior.  Yes for bruxism.     Yes for morning headaches.  Yes for snoring.  Yes for observed apnea.  No for FHx of MYRTLE.     SHx:  , lives with wife and two sons.  Works as teacher and HS football / .    A/P for WatchPAT HST without use of self-fit MAD to assess current severity.    Today -we reviewed his home sleep test results in detail.    I did not have WatchPAT HST results.    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, accidental or unintentional, subsequent encounter 05/22/2018     Priority: Medium    Allergic rhinitis due to dust mite 05/22/2018     Priority: Medium    Allergic rhinitis due to animal dander 05/22/2018     Priority: Medium    Allergic rhinitis due to mold 05/22/2018     Priority: Medium    Old complete tear of " anterior cruciate ligament of right knee 08/11/2016     Priority: Medium    Chondromalacia of left knee 08/11/2016     Priority: Medium    CARDIOVASCULAR SCREENING; LDL GOAL LESS THAN 160 10/31/2010     Priority: Medium       10 point ROS of systems including Constitutional, Eyes, Respiratory, Cardiovascular, Gastroenterology, Genitourinary, Integumentary, Muscularskeletal, Psychiatric were all negative except for pertinent positives noted in my HPI.    Current Outpatient Medications   Medication Sig Dispense Refill    azithromycin (ZITHROMAX) 250 MG tablet Two tablets first day, then one tablet daily for four days (Patient not taking: Reported on 5/23/2023) 6 tablet 0    cyclobenzaprine (FLEXERIL) 10 MG tablet TAKE 1 TABLET BY MOUTH AT BEDTIME AS NEEDED AS NEEDED FOR MUSCLE SPASMS (Patient not taking: Reported on 5/23/2023) 21 tablet 0    EPINEPHrine (AUVI-Q) 0.3 MG/0.3ML injection 2-pack Inject 0.3 mLs (0.3 mg) into the muscle as needed for anaphylaxis (Patient not taking: Reported on 1/14/2020) 4 mL 0    predniSONE (DELTASONE) 20 MG tablet Take 60 mg by mouth daily Take all 3 tablets in the morning.. (Patient not taking: Reported on 1/14/2020) 21 tablet 0    sildenafil (VIAGRA) 100 MG tablet Take 0.5-1 tablet by mouth 30 minutes before intercourse.  No more than 1 full tab/day. (Patient not taking: Reported on 5/23/2023) 10 tablet 5       OBJECTIVE:  There were no vitals taken for this visit.    Physical Exam     ---  This note was written with the assistance of the Dragon voice-dictation technology software. The final document, although reviewed, may contain errors. For corrections, please contact the office.    Total time spent preparing to see the patient, review of chart, obtaining history and physical examination, review of sleep testing, review of treatment options, education, discussion with patient and documenting in Epic / EMR was 25 minutes.  All time involved was spent on the day of service for the  patient (the same day as the patient's appointment).    Macho Zelaya MD    Sleep Medicine  [place], [place]  Main Office: [phone]  [facility] [address], [address]  Schedule visits: [phone]  Main Office: [phone]  Fax: [phone]

## 2024-07-01 NOTE — PROGRESS NOTES
WatchPAT data has been received and has been scored using rule 1B, 4%. Patient to follow up with provider to determine appropriate therapy.    Pat AHI: 10.5    Ordering Provider: Dr Macho Zelaya      Sleep Technician/Technologist: Sandy

## 2024-07-02 ENCOUNTER — VIRTUAL VISIT (OUTPATIENT)
Dept: PULMONOLOGY | Facility: OTHER | Age: 44
End: 2024-07-02
Attending: FAMILY MEDICINE
Payer: COMMERCIAL

## 2024-07-02 VITALS — BODY MASS INDEX: 32.21 KG/M2 | HEIGHT: 70 IN | WEIGHT: 225 LBS

## 2024-07-02 DIAGNOSIS — G47.33 OSA (OBSTRUCTIVE SLEEP APNEA): Primary | ICD-10-CM

## 2024-07-02 DIAGNOSIS — F45.8 BRUXISM: ICD-10-CM

## 2024-07-02 DIAGNOSIS — G47.10 HYPERSOMNIA: ICD-10-CM

## 2024-07-02 PROCEDURE — 99213 OFFICE O/P EST LOW 20 MIN: CPT | Mod: 95 | Performed by: FAMILY MEDICINE

## 2024-07-02 PROCEDURE — G2211 COMPLEX E/M VISIT ADD ON: HCPCS | Mod: 95 | Performed by: FAMILY MEDICINE

## 2024-07-02 ASSESSMENT — PAIN SCALES - GENERAL: PAINLEVEL: NO PAIN (0)

## 2024-07-02 NOTE — NURSING NOTE
Has patient had flu shot for current/most recent flu season? If so, when? Yes: 2/7/24        Current patient location:  [place]    Is the patient currently in the state of [place]? YES    Visit mode:VIDEO    If the visit is dropped, the patient can be reconnected by: VIDEO VISIT: Text to cell phone:   Telephone Information:   Mobile [phone]       Will anyone else be joining the visit? NO  (If patient encounters technical issues they should call [phone]    How would you like to obtain your AVS? MyChart    Are changes needed to the allergy or medication list? No    Are refills needed on medications prescribed by this physician? NO    Reason for visit: RECHECK    Alesia KHANNA

## 2024-07-02 NOTE — PATIENT INSTRUCTIONS
Hello,    It was a pleasure to meet you today.  Here is a summary of our plan:    Our plan was to proceed with getting you fit for the professional sleep apnea mouthguard device.  I did call and verified that your primary dentist does work with these, I did let them know that we will be sending a referral over and I will try to fax information to them today.  They recommend that you give them a call to set up a visit for the sleep apnea mouthguard with them.  For the significant teeth grinding, we do not have a large studies regarding  treatment options.  As we had briefly mention, typically this is involved either a oral medication take it at nighttime or Botox injections to some of the strong muscles that control the jaw have had small studies showing benefit.  We usually do consider this when there is still symptoms we presume are related to teeth grinding even with the use of the bite guard.    Macho Zelaya MD    Sleep Medicine  [place], [place]  Main Office: [phone]  [facility] [place]  [address] [place] [address], [address]  Schedule visits: [phone]  Main Office: [phone]  Fax: [phone]

## 2024-07-02 NOTE — Clinical Note
Can we please fax a copy of the sleep dental referral as well as a copy of his WatchPAT home sleep test to:  [facility] [address] [place] [id number] [email or web address] Phone:  [phone] Fax: [phone]

## 2024-07-03 NOTE — PROCEDURES
"WatchPAT - HOME SLEEP STUDY INTERPRETATION    Patient: Jon Gilmore  MRN: [id number]  YOB: 1980  Study Date: 2024  Referring Provider: London Monk  Ordering Provider: Macho Zelaya MD, MD    Chain of custody patient verification was not enabled.       Indications for Home Study: Jon Gilmore is a 43 year old male with a history of bruxism, mild who presents with need to assess severity of MYRTLE without use of oral appliance.    Prior Sleep Testin2018 - PSG with weight 229 lbs, BMI 31.7.  AHI 5.8.  SpO2 <= 88% for 0.6 minutes.  PLM index 3.1.    Estimated body mass index is 32.28 kg/m  as calculated from the following:    Height as of 24: 1.778 m (5' 10\").    Weight as of 24: 102.1 kg (225 lb).    Data: A full night home sleep study was performed recording the standard physiologic parameters including peripheral arterial tonometry (PAT), sound/snoring, body position,  movement, sound, and oxygen saturation by pulse oximetry. Pulse rate was estimated by oximetry recording. Sleep staging (wake, REM, light, and deep sleep) was derived from PAT signal.  This study was considered adequate based on > 4 hours of quality oximetry and respiratory recording. As specified by the AASM Manual for the Scoring of Sleep and Associated events, version 2.3, Rule VIII.D 1B, 4% oxygen desaturation scoring for hypopneas is used as a standard of care on all home sleep apnea testing.    Total Recording Time: 9 hrs, 21 min  Total Sleep Time: 8 hrs, 12 min  % of Sleep Time REM: 31.5%    Respiratory:  Snoring: Snoring was present.  Respiratory events: The PAT respiratory disturbance index [pRDI] was 13.3 events per hour.  The PAT apnea/hypopnea index [pAHI] was 10.5 events per hour.  ROBIN was 9.5 events per hour.  During REM sleep the pAHI was 24.1.  Sleep Associated Hypoxemia: sustained hypoxemia was not present. Mean oxygen saturation was 95%.  Minimum was 85%.  Time with saturation " less than 88% was 0.3 minutes.    Heart Rate: By pulse oximetry and cardiac rhythm analysis, normal rate was noted.     Position: Percent of time spent: supine - 87.5%, prone - 0%, on right - 0.2%, on left - 12.3%.  pAHI was 11 per hour supine, - per hour prone, - per hour on right side, and 7 per hour on left side.     Assessment:   Mild obstructive sleep apnea.  Sleep associated hypoxemia was not present.    Recommendations:  Consider auto-CPAP at 5-15 cmH2O, oral appliance therapy, or polysomnography with full night PAP titration.  Suggest optimizing sleep hygiene and avoiding sleep deprivation.  Weight management.    Diagnosis Code(s): Obstructive Sleep Apnea G47.33    Macho Zelaya MD, MD, July 3, 2024   Diplomate, American Board of Family Medicine, Sleep Medicine

## 2024-12-29 NOTE — LETTER
January 6, 2025      Jon Gilmore  [address]  [facility] [address]        Dear Jon,     We are concerned about your health care. Many medications require routine follow-up with your Doctor.      At this time we ask that: you schedule a establish care office visit with a physician. Call the clinic at [phone] to schedule.     Your prescription:  Refill has been denied, until the above appointment has been completed.         Sincerely,        Your [employer] [facility] team.

## 2024-12-30 NOTE — TELEPHONE ENCOUNTER
Clinic RN: Please investigate patient's chart or contact patient if the information cannot be found because the medication is listed as historical or discontinued. Confirm patient is taking this medication. Document findings and route refill encounter to provider for approval or denial.    Lisa Abreu, PORTERN, RN

## 2024-12-31 NOTE — TELEPHONE ENCOUNTER
RN TRIAGE CALL:    Patient Contact    Attempt # 1    Was call answered?  No.  Left message on voicemail with information to call me back.    mometasone (ELOCON) 0.1 % cream last prescribed 4/29/2016. Patient last seen here 5/23/23.     PORTER SouzaN, RN

## 2025-01-03 RX ORDER — MOMETASONE FUROATE 1 MG/G
CREAM TOPICAL
Qty: 45 G | Refills: 3 | OUTPATIENT
Start: 2025-01-03

## 2025-01-03 NOTE — TELEPHONE ENCOUNTER
RN TRIAGE CALL:    Patient Contact    Attempt # 2    Was call answered?  No.  Left message on voicemail with information to call me back. Two attempts made to contact patient. Routing to previous PCP covering provider for review.     PORTER SouzaN, RN

## 2025-04-07 ENCOUNTER — VIRTUAL VISIT (OUTPATIENT)
Dept: PULMONOLOGY | Facility: OTHER | Age: 45
End: 2025-04-07
Attending: FAMILY MEDICINE
Payer: COMMERCIAL

## 2025-04-07 VITALS — WEIGHT: 225 LBS | BODY MASS INDEX: 31.5 KG/M2 | HEIGHT: 71 IN

## 2025-04-07 DIAGNOSIS — G47.33 OSA (OBSTRUCTIVE SLEEP APNEA): Primary | ICD-10-CM

## 2025-04-07 DIAGNOSIS — G47.10 HYPERSOMNIA: ICD-10-CM

## 2025-04-07 ASSESSMENT — PAIN SCALES - GENERAL: PAINLEVEL_OUTOF10: NO PAIN (0)

## 2025-04-07 NOTE — PROGRESS NOTES
"Virtual Visit Details    Type of service:  Video Visit     Originating Location (pt. Location): Home    Distant Location (provider location):  On-site  Platform used for Video Visit: Taj        Jon Gilmore is a 44 year old male who is being evaluated via a billable video visit.       The patient has been notified of following:      \"This video visit will be conducted via a call between you and your physician/provider. We have found that certain health care needs can be provided without the need for an in-person physical exam.  This service lets us provide the care you need with a video conversation.  If a prescription is necessary we can send it directly to your pharmacy.  If lab work is needed we can place an order for that and you can then stop by our lab to have the test done at a later time.     Video visits are billed at different rates depending on your insurance coverage.  Please reach out to your insurance provider with any questions.     If during the course of the call the physician/provider feels a video visit is not appropriate, you will not be charged for this service.\"     Patient has given verbal consent for Video visit? Yes  How would you like to obtain your AVS? Mail a copy  If you are dropped from the video visit, the video invite should be resent to: Text to cell phone: -  Will anyone else be joining your video visit? No  If patient encounters technical issues they should call [phone]      Video-Visit Details     Type of service:  Video Visit     Start Time:  2:30pm  End Time:  2:50pm    Originating Location (pt. Location): Home     Distant Location (provider location):  [facility]       Platform used for Video Visit: Taj    Virtual visit for follow-up of mild MYRTLE and desire to start CPAP.     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    He will continue to use a smaller bite guard since it has been quite effective for preventing symptoms of bruxism.    We will plan to proceed with start CPAP auto titrate 5-15 cm H2O, orders placed today.    SUBJECTIVE:  Jon Gilmore is a 44 year old male.    Pertinent PMHx of chronic allergic rhinitis.     Prior Sleep Testin2018 - PSG with weight 229 lbs, BMI 31.7.  AHI 5.8.  SpO2 <= 88% for 0.6 minutes.  PLM index 3.1.  2024 -WatchPAT HST with weight 236 lbs.  pAHI 10.5.  Mean SpO2 95%, niall 85%.  Time less than or equal to 88% of 1.4 minutes.     2024 - We reviewed his sleep history in more detail.  He notes he was prescribed a mandibular advancement device (MAD) after his 2018 PSG and he did have this made by dentistry, but it did not fit well and has only been using the upper half for teeth grinding.     He notes over the past two years an increase in AM headaches, gasping awakenings, snoring.     Given his poor sleep, he did obtain a self-fit MAD (Snore Rx) and has noted significant benefit of less tired, decreased headaches.  He is concerned the SnoreRx guard will not last long secondary to his bruxism.     ---     Per questionnaire: \"I am catching myself gasping for air, partner says I am snoring loudly. I wake up tired and sometimes with a headache that will not go away. I am not getting good sleep. \"     Sxs for few years.  Recommended for oral appliance following prior PSG.     Caffeine use:  No for 3+ per day.  No for within 6 hours of bed.     Tobacco use: No     Sleep pattern:  Workdays.  10pm - 6am, total sleep time 5-8 hours.  Weekends.  9pm - 8am, total sleep time 8 hours.  Time to fall asleep: ~5 minutes.  Awakenings: 5 times per " night, 5 minutes to return to sleep, awake for total of 0.5 hours per night.  Napping.  0 days per week, - hours per nap.     Yes for RLS screen.  No for sleep walking.  No for dream enactment behavior.  Yes for bruxism.     Yes for morning headaches.  Yes for snoring.  Yes for observed apnea.  No for FHx of MYRTLE.     SHx:  , lives with wife and two sons.  Works as teacher and HS football / .     A/P for WatchPAT HST without use of self-fit MAD to assess current severity.     7/2/2024 -we reviewed his home sleep test results in detail.     A/P for MAD for mild MYRTLE, bruxism.    Today -he presents today primarily to discuss start of CPAP.  He did meet with sleep dentistry and did have fabrication of a formal mandibular advancement device, but in general has not been fully effective.  Even with use, there has been persistent snoring, and reports of worsening observed apnea and more frequent choking and gasping arousals.  He does feel with use of the mouthguard that there is less teeth grinding and less headaches.    Given significant fatigue, as well as recommendation from his dentist, he was interested in starting CPAP.    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, accidental or unintentional, subsequent encounter 05/22/2018     Priority: Medium    Allergic rhinitis due to dust mite 05/22/2018     Priority: Medium    Allergic rhinitis due to animal dander 05/22/2018     Priority: Medium    Allergic rhinitis due to mold 05/22/2018     Priority: Medium    Old complete tear of anterior cruciate ligament of right knee 08/11/2016     Priority: Medium    Chondromalacia of left knee  08/11/2016     Priority: Medium    CARDIOVASCULAR SCREENING; LDL GOAL LESS THAN 160 10/31/2010     Priority: Medium       10 point ROS of systems including Constitutional, Eyes, Respiratory, Cardiovascular, Gastroenterology, Genitourinary, Integumentary, Muscularskeletal, Psychiatric were all negative except for pertinent positives noted in my HPI.    Current Outpatient Medications   Medication Sig Dispense Refill    azithromycin (ZITHROMAX) 250 MG tablet Two tablets first day, then one tablet daily for four days (Patient not taking: Reported on 5/23/2023) 6 tablet 0    cyclobenzaprine (FLEXERIL) 10 MG tablet TAKE 1 TABLET BY MOUTH AT BEDTIME AS NEEDED AS NEEDED FOR MUSCLE SPASMS (Patient not taking: Reported on 5/23/2023) 21 tablet 0    EPINEPHrine (AUVI-Q) 0.3 MG/0.3ML injection 2-pack Inject 0.3 mLs (0.3 mg) into the muscle as needed for anaphylaxis (Patient not taking: Reported on 1/14/2020) 4 mL 0    predniSONE (DELTASONE) 20 MG tablet Take 60 mg by mouth daily Take all 3 tablets in the morning.. (Patient not taking: Reported on 1/14/2020) 21 tablet 0    sildenafil (VIAGRA) 100 MG tablet Take 0.5-1 tablet by mouth 30 minutes before intercourse.  No more than 1 full tab/day. (Patient not taking: Reported on 5/23/2023) 10 tablet 5       OBJECTIVE:  There were no vitals taken for this visit.    Physical Exam     ---  This note was written with the assistance of the Dragon voice-dictation technology software. The final document, although reviewed, may contain errors. For corrections, please contact the office.    Total time spent preparing to see the patient, review of chart, obtaining history and physical examination, review of sleep testing, review of treatment options, education, discussion with patient and documenting in Epic / EMR was 25 minutes.  All time involved was spent on the day of service for the patient (the same day as the patient's appointment).    Macho Zelaya MD    Sleep Medicine  [facility]  [facility]  - [place], [place]  Main Office: [phone]  [facility] - [address], [address]  Schedule visits: [phone]  Main Office: [phone]  Fax: [phone]

## 2025-04-07 NOTE — NURSING NOTE
Current patient location: Pt is at work per pt    Is the patient currently in the state of [place]? YES    Visit mode: VIDEO    If the visit is dropped, the patient can be reconnected by:VIDEO VISIT: Send to e-mail at: [email or web address]    Will anyone else be joining the visit? NO  (If patient encounters technical issues they should call [phone]    Are changes needed to the allergy or medication list?  Pt is currently not taking any medication at this time per pt    Are refills needed on medications prescribed by this physician? NO    Rooming Documentation:  Questionnaire(s) not pre-assigned    Reason for visit: RECHECK    No other vitals to report per pt    Rody RODRIGUEZF

## 2025-07-13 ENCOUNTER — HEALTH MAINTENANCE LETTER (OUTPATIENT)
Age: 45
End: 2025-07-13